# Patient Record
Sex: FEMALE | Race: WHITE | NOT HISPANIC OR LATINO | Employment: OTHER | URBAN - METROPOLITAN AREA
[De-identification: names, ages, dates, MRNs, and addresses within clinical notes are randomized per-mention and may not be internally consistent; named-entity substitution may affect disease eponyms.]

---

## 2017-03-29 ENCOUNTER — PATIENT OUTREACH (OUTPATIENT)
Dept: HEALTH INFORMATION MANAGEMENT | Facility: OTHER | Age: 77
End: 2017-03-29

## 2017-03-29 NOTE — PROGRESS NOTES
Attempt #:1    Verify PCP: yes    Communication Preference Obtained: yes     Review Care Team: no    Annual Wellness Visit Scheduling  1. Scheduling Status:Scheduled        Care Gap Scheduling (Attempt to Schedule EACH Overdue Care Gap!)     Health Maintenance Due   Topic Date Due   • Annual Wellness Visit  Scheduled   • IMM ZOSTER VACCINE     • BONE DENSITY  Will discuss   • IMM PNEUMOCOCCAL 65+ (ADULT) LOW/MEDIUM RISK SERIES (2 of 2 - PCV13) Due for Prevnar   • MAMMOGRAM  Patient had mammo 8/2016 scanned into media   • IMM INFLUENZA (1)          MyChart Activation: declined  Connectloudhart Ana: no  Virtual Visits: no  Opt In to Text Messages: no

## 2017-04-03 ENCOUNTER — OFFICE VISIT (OUTPATIENT)
Dept: MEDICAL GROUP | Facility: PHYSICIAN GROUP | Age: 77
End: 2017-04-03
Payer: MEDICARE

## 2017-04-03 VITALS
OXYGEN SATURATION: 98 % | HEIGHT: 63 IN | WEIGHT: 162.2 LBS | BODY MASS INDEX: 28.74 KG/M2 | SYSTOLIC BLOOD PRESSURE: 128 MMHG | DIASTOLIC BLOOD PRESSURE: 82 MMHG | HEART RATE: 76 BPM | TEMPERATURE: 98.2 F

## 2017-04-03 DIAGNOSIS — M51.36 DEGENERATIVE LUMBAR DISC: ICD-10-CM

## 2017-04-03 DIAGNOSIS — N95.1 SYMPTOMATIC MENOPAUSAL OR FEMALE CLIMACTERIC STATES: ICD-10-CM

## 2017-04-03 DIAGNOSIS — R53.82 CHRONIC FATIGUE: ICD-10-CM

## 2017-04-03 DIAGNOSIS — I10 ESSENTIAL HYPERTENSION, BENIGN: Chronic | ICD-10-CM

## 2017-04-03 DIAGNOSIS — E55.9 VITAMIN D DEFICIENCY: ICD-10-CM

## 2017-04-03 DIAGNOSIS — Z00.00 MEDICARE ANNUAL WELLNESS VISIT, INITIAL: Primary | ICD-10-CM

## 2017-04-03 DIAGNOSIS — E78.00 PURE HYPERCHOLESTEROLEMIA: Chronic | ICD-10-CM

## 2017-04-03 DIAGNOSIS — R94.31 ABNORMAL EKG: ICD-10-CM

## 2017-04-03 DIAGNOSIS — M47.817 LUMBOSACRAL SPONDYLOSIS WITHOUT MYELOPATHY: ICD-10-CM

## 2017-04-03 DIAGNOSIS — H61.23 CERUMEN DEBRIS ON TYMPANIC MEMBRANE OF BOTH EARS: ICD-10-CM

## 2017-04-03 DIAGNOSIS — H61.23 EXCESSIVE CERUMEN IN BOTH EAR CANALS: ICD-10-CM

## 2017-04-03 PROCEDURE — G8510 SCR DEP NEG, NO PLAN REQD: HCPCS | Performed by: NURSE PRACTITIONER

## 2017-04-03 PROCEDURE — G0438 PPPS, INITIAL VISIT: HCPCS | Performed by: NURSE PRACTITIONER

## 2017-04-03 PROCEDURE — 1036F TOBACCO NON-USER: CPT | Performed by: NURSE PRACTITIONER

## 2017-04-03 RX ORDER — M-VIT,TX,IRON,MINS/CALC/FOLIC 27MG-0.4MG
1 TABLET ORAL DAILY
COMMUNITY

## 2017-04-03 RX ORDER — MULTIVIT WITH MINERALS/LUTEIN
1 TABLET ORAL DAILY
COMMUNITY
End: 2023-08-30

## 2017-04-03 RX ORDER — THIAMINE MONONITRATE (VIT B1) 100 MG
100 TABLET ORAL DAILY
COMMUNITY

## 2017-04-03 ASSESSMENT — PATIENT HEALTH QUESTIONNAIRE - PHQ9: CLINICAL INTERPRETATION OF PHQ2 SCORE: 0

## 2017-04-03 NOTE — ASSESSMENT & PLAN NOTE
Chronic condition managed with current medical regimen  States unchanged   Continue with surveillance  Followed by Marbella LEYVA M.D..

## 2017-04-03 NOTE — ASSESSMENT & PLAN NOTE
States stopped med due to breast pain and L breast larger than right  Will be evaluated 4/10/2017. Had normal mammo 8/2016  States cont to have hot flashes

## 2017-04-03 NOTE — ASSESSMENT & PLAN NOTE
Chronic condition managed with current medical regimen  Labs reviewed   Stable per review   Continue with diet management  Followed by Marbella LEYVA M.D..

## 2017-04-03 NOTE — ASSESSMENT & PLAN NOTE
States expected to have ears irrigated at this appt  Has an appt 4/10/2017 will be managed then  States was sched to have ears irrigated at previous appt, not done due to pt leaving, states was charged

## 2017-04-03 NOTE — PATIENT INSTRUCTIONS
Continue with care through Marbella LEYVA M.D..  Next Medicare Annual Wellness Visit is due in one year.    Continue care with specialists you are seeing for your chronic problems.    Attached is some preventative information for you to read.          Fall Prevention and Home Safety  Falls cause injuries and can affect all age groups. It is possible to prevent falls.   HOW TO PREVENT FALLS  · Wear shoes with rubber soles that do not have an opening for your toes.   · Keep the inside and outside of your house well lit.   · Use night lights throughout your home.   · Remove clutter from floors.   · Clean up floor spills.   · Remove throw rugs or fasten them to the floor with carpet tape.   · Do not place electrical cords across pathways.   · Put grab bars by your tub, shower, and toilet. Do not use towel bars as grab bars.   · Put handrails on both sides of the stairway. Fix loose handrails.   · Do not climb on stools or stepladders, if possible.   · Do not wax your floors.   · Repair uneven or unsafe sidewalks, walkways, or stairs.   · Keep items you use a lot within reach.   · Be aware of pets.   · Keep emergency numbers next to the telephone.   · Put smoke detectors in your home and near bedrooms.   Ask your doctor what other things you can do to prevent falls.  Document Released: 10/14/2010 Document Revised: 06/18/2013 Document Reviewed: 03/19/2013  ExitCare® Patient Information ©2013 [employer].    Exercise to Stay Healthy      Exercise helps you become and stay healthy.  EXERCISE IDEAS AND TIPS  Choose exercises that:  · You enjoy.   · Fit into your day.   You do not need to exercise really hard to be healthy. You can do exercises at a slow or medium level and stay healthy. You can:  · Stretch before and after working out.   · Try yoga, Pilates, or todd chi.   · Lift weights.   · Walk fast, swim, jog, run, climb stairs, bicycle, dance, or rollerskate.   · Take aerobic classes.   Exercises that burn about  150 calories:  · Running 1 ½ miles in 15 minutes.   · Playing volleyball for 45 to 60 minutes.   · Washing and waxing a car for 45 to 60 minutes.   · Playing touch football for 45 minutes.   · Walking 1 ¾ miles in 35 minutes.   · Pushing a stroller 1 ½ miles in 30 minutes.   · Playing basketball for 30 minutes.   · Raking leaves for 30 minutes.   · Bicycling 5 miles in 30 minutes.   · Walking 2 miles in 30 minutes.   · Dancing for 30 minutes.   · Shoveling snow for 15 minutes.   · Swimming laps for 20 minutes.   · Walking up stairs for 15 minutes.   · Bicycling 4 miles in 15 minutes.   · Gardening for 30 to 45 minutes.   · Jumping rope for 15 minutes.   · Washing windows or floors for 45 to 60 minutes.     One suggestion is to start walking for 10 minutes after each meal.  This will help with digestion and help you to metabolize your meal.       For Weight Loss -   Recommend portion sizes with more fruits/vegetables/high fiber foods.  Stay away from white bread/pastas/white rice/white potatoes.  Increase Fluid intake to 6-8 glasses of water daily.   Eliminate soda's (diet and regular) from your fluid intake.      Document Released: 01/20/2012 Document Revised: 03/11/2013 Document Reviewed: 01/20/2012  ExitCare® Patient Information ©2013 [employer].    Fat and Cholesterol Control Diet  Cholesterol is a wax-like substance. It comes from your liver and is found in certain foods. There is good (HDL) and bad (LDL) cholesterol. Too much cholesterol in your blood can affect your heart. Certain foods can lower or raise your cholesterol. Eat foods that are low in cholesterol.  Saturated and trans fats are bad fats found in foods that will raise your cholesterol. Do not eat foods that are high in saturated and trans fats.  FOODS HIGHER IN SATURATED AND TRANS FATS  · Dairy products, such as whole milk, eggs, cheese, cream, and butter.   · Fatty meats, such as hot dogs, sausage, and salami.   · Fried foods.   · Trans fats  which are found in margarine and pre-made cookies, crackers, and baked goods.   · Tropical oils, such as coconut and palm oils.   Read package labels at the store. Do not buy products that use saturated or trans fats or hydrogenated oils. Find foods labeled:  · Low-fat.   · Low-saturated fat.   · Trans-fat-free.   · Low-cholesterol.   FOODS LOWER IN CHOLESTEROL  ·  Fruit.   · Vegetables.   · Beans, peas, and lentils.   · Fish.   · Lean meat, such as chicken (without skin) or ground turkey.   · Grains, such as barley, rice, couscous, bulgur wheat, and pasta.   · Heart-healthy tub margarine.   PREPARING YOUR FOOD  · Broil, bake, steam, or roast foods. Do not benjamin food.   · Use non-stick cooking sprays.   · Use lemon or herbs to flavor food instead of using butter or stick margarine.   · Use nonfat yogurt, salsa, or low-fat dressings for salads.   Document Released: 06/18/2013 Document Reviewed: 06/18/2013  ExitCare® Patient Information ©2013 [employer].    Recommend annual flu vaccine.  Next due in Fall, 2015.  If you decide not to have the flu vaccine, recommend good handwashing and staying out of crowds during flu season.

## 2017-04-03 NOTE — ASSESSMENT & PLAN NOTE
Chronic condition managed with current medical regimen  Stable per review   Continue with current OTC prn meds  Followed by Marbella LEYVA M.D..

## 2017-04-03 NOTE — ASSESSMENT & PLAN NOTE
Chronic condition managed with current medical regimen  Stable per review   Continue with current meds  Followed by Marbella LEYVA M.D..

## 2017-04-03 NOTE — MR AVS SNAPSHOT
"        Julienne Ross Mirna   4/3/2017 10:40 AM   Office Visit   MRN: [id number]    Department:  [facility]   Dept Phone:  [phone]    Description:  Female : 1940   Provider:  YASMIN Cano           Reason for Visit     Annual Exam initial      Allergies as of 4/3/2017     Allergen Noted Reactions    Gabapentin (Phn) 2015   Unspecified    Patient stated that she believes medication made it hard for her to stand alone and use restroom.     Pcn [Penicillins] 2009   Rash    Tramadol 2015       dizzy      You were diagnosed with     Medicare annual wellness visit, initial   [501935]  -  Primary     Vitamin D deficiency   [8190485]       Symptomatic menopausal or female climacteric states   [627.2.ICD-9-CM]       Pure hypercholesterolemia   [272.0.ICD-9-CM]       Lumbosacral spondylosis without myelopathy   [721.3.ICD-9-CM]       Excessive cerumen in both ear canals   [0156889]       Essential hypertension, benign   [401.1.ICD-9-CM]       Degenerative lumbar disc   [179886]       Chronic fatigue   [949300]       Cerumen debris on tympanic membrane of both ears   [3595180]       Abnormal EKG   [407875]         Vital Signs     Blood Pressure Pulse Temperature Height Weight Body Mass Index    128/82 mmHg 76 36.8 °C (98.2 °F) 1.6 m (5' 3\") 73.573 kg (162 lb 3.2 oz) 28.74 kg/m2    Oxygen Saturation Smoking Status                98% Never Smoker           Basic Information     Date Of Birth Sex Race Ethnicity Preferred Language    1940 Female White Non- English      Your appointments     Apr 10, 2017  1:40 PM   Established Patient with YASMIN Chi   [facility] (--)    [address] [place] [address] [phone]           You will be receiving a confirmation call a few days before your appointment from our automated call confirmation system.            2017  9:00 AM   Adult Draw/Collection with HAILY RUANO   "   LAB - ED (--)    [address] [address]   [phone]            May 02, 2017  9:00 AM   Established Patient with Marbella LEYVA M.D.   [facility] (--)    [address] [address] [phone]           You will be receiving a confirmation call a few days before your appointment from our automated call confirmation system.              Problem List              ICD-10-CM Priority Class Noted - Resolved    Symptomatic menopausal or female climacteric states N95.1   Unknown - Present    Osteoarthritis of multiple joints M15.9   Unknown - Present    Allergic rhinitis J30.9   Unknown - Present    Pure hypercholesterolemia (Chronic) E78.00   3/25/2010 - Present    Vitamin D deficiency E55.9   3/17/2011 - Present    Degenerative lumbar disc M51.36   2/23/2012 - Present    Essential hypertension, benign (Chronic) I10   4/5/2013 - Present    Pain of foot M79.673   3/20/2015 - Present    Lumbosacral spondylosis without myelopathy M47.817   4/4/2015 - Present    Abnormal EKG R94.31   5/19/2015 - Present    Excessive cerumen in both ear canals H61.23   10/20/2015 - Present    Chronic fatigue R53.82   7/28/2016 - Present      Health Maintenance        Date Due Completion Dates    IMM ZOSTER VACCINE 4/19/2001 ---    BONE DENSITY 4/19/2006 ---    IMM PNEUMOCOCCAL 65+ (ADULT) LOW/MEDIUM RISK SERIES (2 of 2 - PCV13) 4/6/2010 4/6/2009    MAMMOGRAM 6/24/2014 6/24/2013 (Done), 6/21/2012 (Prv Comp)    Override on 6/24/2013: Done ([facility]- Normal- 1yr)    Override on 6/21/2012: Previously completed ([facility], negative will do in 2013)    COLONOSCOPY 4/5/2023 4/5/2013 (Declined)    Override on 4/5/2013: Patient Declined (Pt refuses completely)    IMM DTaP/Tdap/Td Vaccine (2 - Td) 2/8/2027 2/8/2017            Current Immunizations     Influenza Vaccine Quad Inj (Pf) 10/9/2014    MMR Vaccine 3/8/2017    Pneumococcal polysaccharide vaccine (PPSV-23) 4/6/2009    Tdap Vaccine  2/8/2017      Below and/or attached are the medications your provider expects you to take. Review all of your home medications and newly ordered medications with your provider and/or pharmacist. Follow medication instructions as directed by your provider and/or pharmacist. Please keep your medication list with you and share with your provider. Update the information when medications are discontinued, doses are changed, or new medications (including over-the-counter products) are added; and carry medication information at all times in the event of emergency situations     Allergies:  GABAPENTIN (PHN) - Unspecified     PCN - Rash     TRAMADOL - (reactions not documented)               Medications  Valid as of: April 03, 2017 - 12:38 PM    Generic Name Brand Name Tablet Size Instructions for use    Cholecalciferol (Tab) cholecalciferol 1000 UNIT Take 1,000 Units by mouth every day.        Hydrocodone-Acetaminophen (Tab) Hydrocodone-Acetaminophen 2.5-325 MG Take 1 Tab by mouth 3 times a day as needed.        Lisinopril (Tab) PRINIVIL 10 MG TAKE 1 TABLET BY MOUTH EVERY DAY        Multiple Vitamins-Minerals (Tab) THERAGRAN-M  Take 1 Tab by mouth every day.        Thiamine HCl (Tab) THIAMINE 100 MG Take 100 mg by mouth every day.        Vitamin E (Cap) VITAMIN E 1000 UNIT Take 1 Cap by mouth every day.        .                 Medicines prescribed today were sent to:     [facility] [id number] [place] [address] [place] - [address] [facility] [address]    [address] [place] [address]    Phone: [phone] Fax: [phone]    Open 24 Hours?: No      Medication refill instructions:       If your prescription bottle indicates you have medication refills left, it is not necessary to call your provider’s office. Please contact your pharmacy and they will refill your medication.    If your prescription bottle indicates you do not have any refills left, you may request refills at any time through one of the following ways:  The online [employer] system (except Urgent Care), by calling your provider’s office, or by asking your pharmacy to contact your provider’s office with a refill request. Medication refills are processed only during regular business hours and may not be available until the next business day. Your provider may request additional information or to have a follow-up visit with you prior to refilling your medication.   *Please Note: Medication refills are assigned a new Rx number when refilled electronically. Your pharmacy may indicate that no refills were authorized even though a new prescription for the same medication is available at the pharmacy. Please request the medicine by name with the pharmacy before contacting your provider for a refill.        Instructions    Continue with care through Marbella LEYVA M.D..  Next Medicare Annual Wellness Visit is due in one year.    Continue care with specialists you are seeing for your chronic problems.    Attached is some preventative information for you to read.          Fall Prevention and Home Safety  Falls cause injuries and can affect all age groups. It is possible to prevent falls.   HOW TO PREVENT FALLS  · Wear shoes with rubber soles that do not have an opening for your toes.   · Keep the inside and outside of your house well lit.   · Use night lights throughout your home.   · Remove clutter from floors.   · Clean up floor spills.   · Remove throw rugs or fasten them to the floor with carpet tape.   · Do not place electrical cords across pathways.   · Put grab bars by your tub, shower, and toilet. Do not use towel bars as grab bars.   · Put handrails on both sides of the stairway. Fix loose handrails.   · Do not climb on stools or stepladders, if possible.   · Do not wax your floors.   · Repair uneven or unsafe sidewalks, walkways, or stairs.   · Keep items you use a lot within reach.   · Be aware of pets.   · Keep emergency numbers next to the telephone.   · Put  smoke detectors in your home and near bedrooms.   Ask your doctor what other things you can do to prevent falls.  Document Released: 10/14/2010 Document Revised: 06/18/2013 Document Reviewed: 03/19/2013  ExitCare® Patient Information ©2013 [employer].    Exercise to Stay Healthy      Exercise helps you become and stay healthy.  EXERCISE IDEAS AND TIPS  Choose exercises that:  · You enjoy.   · Fit into your day.   You do not need to exercise really hard to be healthy. You can do exercises at a slow or medium level and stay healthy. You can:  · Stretch before and after working out.   · Try yoga, Pilates, or todd chi.   · Lift weights.   · Walk fast, swim, jog, run, climb stairs, bicycle, dance, or rollerskate.   · Take aerobic classes.   Exercises that burn about 150 calories:  · Running 1 ½ miles in 15 minutes.   · Playing volleyball for 45 to 60 minutes.   · Washing and waxing a car for 45 to 60 minutes.   · Playing touch football for 45 minutes.   · Walking 1 ¾ miles in 35 minutes.   · Pushing a stroller 1 ½ miles in 30 minutes.   · Playing basketball for 30 minutes.   · Raking leaves for 30 minutes.   · Bicycling 5 miles in 30 minutes.   · Walking 2 miles in 30 minutes.   · Dancing for 30 minutes.   · Shoveling snow for 15 minutes.   · Swimming laps for 20 minutes.   · Walking up stairs for 15 minutes.   · Bicycling 4 miles in 15 minutes.   · Gardening for 30 to 45 minutes.   · Jumping rope for 15 minutes.   · Washing windows or floors for 45 to 60 minutes.     One suggestion is to start walking for 10 minutes after each meal.  This will help with digestion and help you to metabolize your meal.       For Weight Loss -   Recommend portion sizes with more fruits/vegetables/high fiber foods.  Stay away from white bread/pastas/white rice/white potatoes.  Increase Fluid intake to 6-8 glasses of water daily.   Eliminate soda's (diet and regular) from your fluid intake.      Document Released: 01/20/2012 Document  Revised: 03/11/2013 Document Reviewed: 01/20/2012  [employer]® Patient Information ©2013 [employer].    Fat and Cholesterol Control Diet  Cholesterol is a wax-like substance. It comes from your liver and is found in certain foods. There is good (HDL) and bad (LDL) cholesterol. Too much cholesterol in your blood can affect your heart. Certain foods can lower or raise your cholesterol. Eat foods that are low in cholesterol.  Saturated and trans fats are bad fats found in foods that will raise your cholesterol. Do not eat foods that are high in saturated and trans fats.  FOODS HIGHER IN SATURATED AND TRANS FATS  · Dairy products, such as whole milk, eggs, cheese, cream, and butter.   · Fatty meats, such as hot dogs, sausage, and salami.   · Fried foods.   · Trans fats which are found in margarine and pre-made cookies, crackers, and baked goods.   · Tropical oils, such as coconut and palm oils.   Read package labels at the store. Do not buy products that use saturated or trans fats or hydrogenated oils. Find foods labeled:  · Low-fat.   · Low-saturated fat.   · Trans-fat-free.   · Low-cholesterol.   FOODS LOWER IN CHOLESTEROL  ·  Fruit.   · Vegetables.   · Beans, peas, and lentils.   · Fish.   · Lean meat, such as chicken (without skin) or ground turkey.   · Grains, such as barley, rice, couscous, bulgur wheat, and pasta.   · Heart-healthy tub margarine.   PREPARING YOUR FOOD  · Broil, bake, steam, or roast foods. Do not benjamin food.   · Use non-stick cooking sprays.   · Use lemon or herbs to flavor food instead of using butter or stick margarine.   · Use nonfat yogurt, salsa, or low-fat dressings for salads.   Document Released: 06/18/2013 Document Reviewed: 06/18/2013  [employer]® Patient Information ©2013 [employer].    Recommend annual flu vaccine.  Next due in Fall, 2015.  If you decide not to have the flu vaccine, recommend good handwashing and staying out of crowds during flu season.               Other Notes  About Your Plan     ER visit for dizzy, signed AMA, CT brain neg, Carotids neg, Echo EF 65%, Cxr increased heart size 10/13/14 [place]           MyChart Status: Patient Declined

## 2017-04-03 NOTE — PROGRESS NOTES
CC:   Medicare Annual Wellness Visit    HPI:  Julienne is a 76 y.o. here for Medicare Annual Wellness Visit    Patient Active Problem List    Diagnosis Date Noted   • Chronic fatigue 07/28/2016   • Excessive cerumen in both ear canals 10/20/2015   • Abnormal EKG 05/19/2015   • Lumbosacral spondylosis without myelopathy 04/04/2015   • Pain of foot 03/20/2015   • Essential hypertension, benign 04/05/2013   • Degenerative lumbar disc 02/23/2012   • Vitamin D deficiency 03/17/2011   • Pure hypercholesterolemia 03/25/2010   • Symptomatic menopausal or female climacteric states    • Osteoarthritis of multiple joints    • Allergic rhinitis      Current Outpatient Prescriptions   Medication Sig Dispense Refill   • therapeutic multivitamin-minerals (THERAGRAN-M) Tab Take 1 Tab by mouth every day.     • vitamin D (CHOLECALCIFEROL) 1000 UNIT Tab Take 1,000 Units by mouth every day.     • vitamin E (VITAMIN E) 1000 UNIT Cap Take 1 Cap by mouth every day.     • thiamine (THIAMINE) 100 MG Tab Take 100 mg by mouth every day.     • lisinopril (PRINIVIL) 10 MG Tab TAKE 1 TABLET BY MOUTH EVERY DAY 90 Tab 1   • Hydrocodone-Acetaminophen 2.5-325 MG Tab Take 1 Tab by mouth 3 times a day as needed. 90 Tab 1     No current facility-administered medications for this visit.      Current supplements: no  Chronic narcotic pain medicines: yes  Allergies: Gabapentin (phn); Pcn; and Tramadol  Exercise: as verna  Current social contact/activities: Has support of family and friends      Screening:  Depression Screening    Little interest or pleasure in doing things?  0 - not at all  Feeling down, depressed , or hopeless? 0 - not at all  Trouble falling or staying asleep, or sleeping too much?     Feeling tired or having little energy?     Poor appetite or overeating?     Feeling bad about yourself - or that you are a failure or have let yourself or your family down?    Trouble concentrating on things, such as reading the newspaper or watching  television?    Moving or speaking so slowly that other people could have noticed.  Or the opposite - being so fidgety or restless that you have been moving around a lot more than usual?     Thoughts that you would be better off dead, or of hurting yourself?     Patient Health Questionnaire Score:    If depressive symptoms identified deferred to follow up visit unless specifically addressed in assessment and plan.      Screening for Cognitive Impairment    Three Minute Recall (banana, sunrise, fence)  2/3    Draw clock face with all 12 numbers set to the hand to show 10 minures past 11 o'clock  1 5  If cognitive concerns identified deferred to follow up visit unless specifically addressed in assessment and plan.    Fall Risk Assessment    Has the patient had two or more falls in the last year or any fall with injury in the last year?  No  If Fall Risk identified deferred to follow up visit unless specifically addressed in assessment and plan.    Safety Assessment    Throw rugs on floor.  No  Handrails on all stairs.  Yes  Good lighting in all hallways.  Yes  Difficulty hearing.  No  Patient counseled about all safety risks that were identified.    Functional Assessment ADLs    Are there any barriers preventing you from cooking for yourself or meeting nutritional needs?  No.    Are there any barriers preventing you from driving safely or obtaining transportation?  No.    Are there any barriers preventing you from using a telephone or calling for help?  No.    Are there any barriers preventing you from shopping?  No.    Are there any barriers preventing you from taking care of your own finances?  No.    Are there any barriers preventing you from managing your medications?  No.    Are currently engaing any exercise or physical activity?  Yes.       Health Maintenance Summary                Annual Wellness Visit Overdue 1940     IMM ZOSTER VACCINE Overdue 4/19/2001     BONE DENSITY Overdue 4/19/2006     IMM  PNEUMOCOCCAL 65+ (ADULT) LOW/MEDIUM RISK SERIES Overdue 4/6/2010      Done 4/6/2009 Imm Admin: Pneumococcal polysaccharide vaccine (PPSV-23)    MAMMOGRAM Overdue 6/24/2014      Done 6/24/2013 [facility]- Normal- 1yr     Patient has more history with this topic...    COLONOSCOPY Next Due 4/5/2023      Patient Declined 4/5/2013 Pt refuses completely    IMM DTaP/Tdap/Td Vaccine Next Due 2/8/2027      Done 2/8/2017 Imm Admin: Tdap Vaccine          Patient Care Team:  Marbella LEYVA M.D. as PCP - General (Family Medicine)  Chris Guevara M.D. as Consulting Physician (Orthopaedics)  Greg Mccarthy M.D. as Consulting Physician (Neurosurgery)        Social History   Substance Use Topics   • Smoking status: Never Smoker    • Smokeless tobacco: Never Used   • Alcohol Use: No       Family History   Problem Relation Age of Onset   • Heart Disease Mother      CAD   • Hypertension Mother    • Stroke Mother      She  has a past medical history of Symptomatic menopausal or female climacteric states; Essential hypertension, malignant; Screening mammogram (5/19/2009); Allergic rhinitis, cause unspecified; Osteoarthrosis involving, or with mention of more than one site, but not specified as generalized, multiple sites; Hypokalemia (3/25/2010); Pure hypercholesterolemia (3/25/2010); Neuritis/radiculitis due to displacement of lumbar intervertebral disc (2/23/2012); Essential hypertension, benign (4/5/2013); Pain of foot (3/20/2015); Pain (4/2/15); Anesthesia; Urinary bladder disorder; Dental disorder; Abnormal EKG (5/19/2015); Excessive cerumen in both ear canals (10/20/2015); and Chronic fatigue (7/28/2016).   Past Surgical History   Procedure Laterality Date   • Laminotomy       cervical spine   • Carpal tunnel release  2006     right   • Lumbar laminectomy diskectomy  4/4/2015     Performed by Greg Mccarthy M.D. at SURGERY [facility]       ROS:    Ostomy or other tubes or amputations: no  Chronic oxygen use no  Last eye exam  "2016  : Denies incontinence.   Gait: Uses no assistive device   Hearing fair.    Dentition fair    Exam: Blood pressure 128/82, pulse 76, temperature 36.8 °C (98.2 °F), height 1.6 m (5' 3\"), weight 73.573 kg (162 lb 3.2 oz), SpO2 98 %. Body mass index is 28.74 kg/(m^2).  Alert, oriented in no acute distress.  Eye contact is good, speech goal directed, affect calm      Assessment and Plan. The following treatment and monitoring plan is recommended for all problems as listed below:   Vitamin D deficiency  Chronic condition managed with current medical regimen  Stable per review   Continue with current meds  Followed by Marbella LEYVA M.D..         Symptomatic menopausal or female climacteric states  States stopped med due to breast pain and L breast larger than right  Will be evaluated 4/10/2017. Had normal mammo 8/2016  States cont to have hot flashes    Pure hypercholesterolemia  Chronic condition managed with current medical regimen  Labs reviewed   Stable per review   Continue with diet management  Followed by Marbella LEYVA M.D..         Osteoarthritis of multiple joints  Chronic condition managed with current medical regimen  Stable per review   Continue with current meds  Followed by Marbella LEYVA M.D..         Pain of foot  Chronic condition managed with current medical regimen  Stable per review   Continue with current meds  Followed by Marbella LEYVA M.D..         Lumbosacral spondylosis without myelopathy  Chronic condition managed with current medical regimen  Stable per review   Continue with current meds  Followed by Marbella LEYVA M.D..        Excessive cerumen in both ear canals  States expected to have ears irrigated at this appt  Has an appt 4/10/2017 will be managed then  States was sched to have ears irrigated at previous appt, not done due to pt leaving, states was charged    Essential hypertension, benign  Chronic condition managed with current medical regimen  Stable per " "review   Continue with current meds  Followed by Marbella LEYVA M.D..        Degenerative lumbar disc  Chronic condition managed with current medical regimen  Stable per review   Continue with current meds  Followed by Marbella LEYVA M.D..        Chronic fatigue  Chronic condition managed with current medical regimen  States unchanged   Continue with surveillance  Followed by Marbella LEYVA M.D..        Cerumen debris on tympanic membrane of both ears  duplicate    Allergic rhinitis  Chronic condition managed with current medical regimen  Stable per review   Continue with current OTC prn meds  Followed by Marbella LEYVA M.D..        Abnormal EKG  To pts knowledge first and only abn EKG  Followed by Marbella LEYVA M.D..         Health Care Screening recommendations reviewed with patient today: per Patient Instructions  DPA/Advanced directive: .has an advanced directive - recom a copy be provided    Next office visit for recheck of chronic medical conditions is due with Marbella LEYVA M.D. 5/2/2017      States mammo was done 2/2017  States agreed to today's appt \"because it is hard to get to see my doctor and my breast is a problem\"  appt made for pt to be seen 4/10/2017 at 1:40 pm for eval  Pt not pleased with not having an exam today    "

## 2017-04-10 ENCOUNTER — OFFICE VISIT (OUTPATIENT)
Dept: MEDICAL GROUP | Facility: PHYSICIAN GROUP | Age: 77
End: 2017-04-10
Payer: MEDICARE

## 2017-04-10 VITALS
TEMPERATURE: 99.1 F | DIASTOLIC BLOOD PRESSURE: 76 MMHG | WEIGHT: 162 LBS | RESPIRATION RATE: 16 BRPM | OXYGEN SATURATION: 98 % | SYSTOLIC BLOOD PRESSURE: 122 MMHG | HEIGHT: 63 IN | BODY MASS INDEX: 28.7 KG/M2 | HEART RATE: 71 BPM

## 2017-04-10 DIAGNOSIS — N64.4 BREAST PAIN, LEFT: ICD-10-CM

## 2017-04-10 DIAGNOSIS — M65.341 TRIGGER RING FINGER OF RIGHT HAND: ICD-10-CM

## 2017-04-10 PROCEDURE — 4040F PNEUMOC VAC/ADMIN/RCVD: CPT | Performed by: NURSE PRACTITIONER

## 2017-04-10 PROCEDURE — G8432 DEP SCR NOT DOC, RNG: HCPCS | Performed by: NURSE PRACTITIONER

## 2017-04-10 PROCEDURE — 1036F TOBACCO NON-USER: CPT | Performed by: NURSE PRACTITIONER

## 2017-04-10 PROCEDURE — 1101F PT FALLS ASSESS-DOCD LE1/YR: CPT | Performed by: NURSE PRACTITIONER

## 2017-04-10 PROCEDURE — G8419 CALC BMI OUT NRM PARAM NOF/U: HCPCS | Performed by: NURSE PRACTITIONER

## 2017-04-10 PROCEDURE — 99213 OFFICE O/P EST LOW 20 MIN: CPT | Performed by: NURSE PRACTITIONER

## 2017-04-10 PROCEDURE — 3017F COLORECTAL CA SCREEN DOC REV: CPT | Mod: 8P | Performed by: NURSE PRACTITIONER

## 2017-04-10 ASSESSMENT — PAIN SCALES - GENERAL: PAINLEVEL: 4=SLIGHT-MODERATE PAIN

## 2017-04-10 NOTE — PROGRESS NOTES
"[facility]  Primary Care Office Visit - Problem-Oriented        History:     Julienne Bradley is a 76 y.o. female who is here today to discuss Breast Problem      Breast pain, left  This is a 76-year-old female who is here for left breast pain that has been ongoing ×1 month. She tells me that she has been on estrogen hormone replacement for nearly 20 years for severe hot flashes, she has tried to taper down and discontinue use of this over the last 2 years but she has had return of incapacitating hot flashes. She developed left breast pain about a month ago and abruptly d/c the estrogen. She also notes that her left breast has suddenly increased in size. She denies any new masses, fevers, unintentional weight loss, night sweats. She has been taking an over-the-counter herbal estrogen replacement which does seem to be improving her hot flashes. Her last MRI was done in August 2016, it did show heterogeneously dense breasts, otherwise normal mammogram.      Trigger ring finger of right hand  Patient also notes that she has right hand ring finger pain and \"catching.\" She tells me that she has to \"open the finger on my own\" with a lot of pain, we discussed that this is trigger finger and she will need to see a hand surgeon, referral has been placed.           Past Medical History   Diagnosis Date   • Symptomatic menopausal or female climacteric states    • Essential hypertension, malignant    • Screening mammogram 5/19/2009     normal   • Allergic rhinitis, cause unspecified    • Osteoarthrosis involving, or with mention of more than one site, but not specified as generalized, multiple sites    • Hypokalemia 3/25/2010   • Pure hypercholesterolemia 3/25/2010   • Neuritis/radiculitis due to displacement of lumbar intervertebral disc 2/23/2012   • Essential hypertension, benign 4/5/2013   • Pain of foot 3/20/2015   • Pain 4/2/15     legs, mostly right, into foot   • Anesthesia      PONV   • Urinary bladder " disorder      nocturia   • Dental disorder      partials, upper and lower   • Abnormal EKG 5/19/2015   • Excessive cerumen in both ear canals 10/20/2015   • Chronic fatigue 7/28/2016     Past Surgical History   Procedure Laterality Date   • Laminotomy       cervical spine   • Carpal tunnel release  2006     right   • Lumbar laminectomy diskectomy  4/4/2015     Performed by Greg Mccarthy M.D. at SURGERY [facility]     Social History     Social History   • Marital Status:      Spouse Name: N/A   • Number of Children: N/A   • Years of Education: N/A     Occupational History   • Not on file.     Social History Main Topics   • Smoking status: Never Smoker    • Smokeless tobacco: Never Used   • Alcohol Use: No   • Drug Use: No   • Sexual Activity: No      Comment: ,retired     Other Topics Concern   •  Service No   • Blood Transfusions No   • Caffeine Concern No   • Occupational Exposure No   • Hobby Hazards No   • Sleep Concern No   • Stress Concern No   • Weight Concern No   • Special Diet No   • Back Care Yes   • Exercise Yes      for grandkids, yardwork    • Bike Helmet No   • Seat Belt Yes   • Self-Exams Yes     Social History Narrative    , retired      History   Smoking status   • Never Smoker    Smokeless tobacco   • Never Used     Family History   Problem Relation Age of Onset   • Heart Disease Mother      CAD   • Hypertension Mother    • Stroke Mother      Allergies   Allergen Reactions   • Gabapentin (Phn) Unspecified     Patient stated that she believes medication made it hard for her to stand alone and use restroom.    • Pcn [Penicillins] Rash   • Tramadol      dizzy       Problem List:     Patient Active Problem List    Diagnosis Date Noted   • Breast pain, left 04/10/2017   • Trigger ring finger of right hand 04/10/2017   • Chronic fatigue 07/28/2016   • Excessive cerumen in both ear canals 10/20/2015   • Abnormal EKG 05/19/2015   • Lumbosacral  "spondylosis without myelopathy 04/04/2015   • Pain of foot 03/20/2015   • Essential hypertension, benign 04/05/2013   • Degenerative lumbar disc 02/23/2012   • Vitamin D deficiency 03/17/2011   • Pure hypercholesterolemia 03/25/2010   • Symptomatic menopausal or female climacteric states    • Osteoarthritis of multiple joints    • Allergic rhinitis          Medications:     Current outpatient prescriptions:   •  therapeutic multivitamin-minerals (THERAGRAN-M) Tab, Take 1 Tab by mouth every day., Disp: , Rfl:   •  vitamin D (CHOLECALCIFEROL) 1000 UNIT Tab, Take 1,000 Units by mouth every day., Disp: , Rfl:   •  vitamin E (VITAMIN E) 1000 UNIT Cap, Take 1 Cap by mouth every day., Disp: , Rfl:   •  thiamine (THIAMINE) 100 MG Tab, Take 100 mg by mouth every day., Disp: , Rfl:   •  lisinopril (PRINIVIL) 10 MG Tab, TAKE 1 TABLET BY MOUTH EVERY DAY, Disp: 90 Tab, Rfl: 1  •  Hydrocodone-Acetaminophen 2.5-325 MG Tab, Take 1 Tab by mouth 3 times a day as needed., Disp: 90 Tab, Rfl: 1      Review of Systems:     (positives in bold), all other systems reviewed and WNL     GYN: breast pain, vaginal discharge,painful intercourse, vaginal itching, abnormal bleeding  MSK: muscle/joint pain, joint swelling      Physical Assessment:     VS: /76 mmHg  Pulse 71  Temp(Src) 37.3 °C (99.1 °F)  Resp 16  Ht 1.6 m (5' 2.99\")  Wt 73.483 kg (162 lb)  BMI 28.70 kg/m2  SpO2 98%    General: Well-developed, well-nourished, female, hard of hearing     Head: PERRL, EOMI. Normocephalic. No facial asymmetry noted.  Cardiovasc:No JVD.  RRR, no MRG. No thrills or bruits. Pulses 2+ and symmetric at all distal extremities.  Pulmonary: Lungs clear bilaterally.  Normal respiratory effort. No wheeze or crackles.   Extremities: trigger finger of right hand ring finger   Neuro: Alert and oriented  Skin:No rashes noted. Skin warm, dry, intact    Psych: Dressed appropriately for the weather, pleasant and conversant.  Affect, mood & judgment " appropriate.    Physical Exam   Pulmonary/Chest:             Assessment/Plan:   Julienne was seen today for breast problem.    Diagnoses and all orders for this visit:    Breast pain, left, new   -     US-BREAST COMPLETE-LEFT; Future    Trigger ring finger of right hand, new   -     REFERRAL TO HAND SURGERY    Patient is agreeable to the above plan and voiced understanding. All questions answered.     Please note that this dictation was created using voice recognition software. I have made every reasonable attempt to correct obvious errors, but I expect that there are errors of grammar and possibly content that I did not discover before finalizing the note.      CYNDI Bourne  4/10/2017, 2:22 PM

## 2017-04-10 NOTE — ASSESSMENT & PLAN NOTE
"Patient also notes that she has right hand ring finger pain and \"catching.\" She tells me that she has to \"open the finger on my own\" with a lot of pain, we discussed that this is trigger finger and she will need to see a hand surgeon, referral has been placed.  "

## 2017-04-10 NOTE — MR AVS SNAPSHOT
"        Julienne Bradley   4/10/2017 1:40 PM   Office Visit   MRN: [id number]    Department:  [facility]   Dept Phone:  [phone]    Description:  Female : 1940   Provider:  YASMIN Chi           Reason for Visit     Breast Problem Left side is getting bigger than other side and hurts.       Allergies as of 4/10/2017     Allergen Noted Reactions    Gabapentin (Phn) 2015   Unspecified    Patient stated that she believes medication made it hard for her to stand alone and use restroom.     Pcn [Penicillins] 2009   Rash    Tramadol 2015       dizzy      You were diagnosed with     Breast pain, left   [407704]       Trigger ring finger of right hand   [[id number]]         Vital Signs     Blood Pressure Pulse Temperature Respirations Height Weight    122/76 mmHg 71 37.3 °C (99.1 °F) 16 1.6 m (5' 2.99\") 73.483 kg (162 lb)    Body Mass Index Oxygen Saturation Smoking Status             28.70 kg/m2 98% Never Smoker          Basic Information     Date Of Birth Sex Race Ethnicity Preferred Language    1940 Female White Non- English      Your appointments     2017  9:00 AM   Adult Draw/Collection with [facility]   [facility] (--)    [address]  [place] [address]   [phone]            May 02, 2017  9:00 AM   Established Patient with Marbella LEYVA M.D.   [facility] (--)    [address]  [facility] [id number] [phone]           You will be receiving a confirmation call a few days before your appointment from our automated call confirmation system.              Problem List              ICD-10-CM Priority Class Noted - Resolved    Symptomatic menopausal or female climacteric states N95.1   Unknown - Present    Osteoarthritis of multiple joints M15.9   Unknown - Present    Allergic rhinitis J30.9   Unknown - Present    Pure hypercholesterolemia (Chronic) E78.00   3/25/2010 - Present    Vitamin D deficiency " E55.9   3/17/2011 - Present    Degenerative lumbar disc M51.36   2/23/2012 - Present    Essential hypertension, benign (Chronic) I10   4/5/2013 - Present    Pain of foot M79.673   3/20/2015 - Present    Lumbosacral spondylosis without myelopathy M47.817   4/4/2015 - Present    Abnormal EKG R94.31   5/19/2015 - Present    Excessive cerumen in both ear canals H61.23   10/20/2015 - Present    Chronic fatigue R53.82   7/28/2016 - Present    Breast pain, left N64.4   4/10/2017 - Present    Trigger ring finger of right hand M65.341   4/10/2017 - Present      Health Maintenance        Date Due Completion Dates    IMM ZOSTER VACCINE 4/19/2001 ---    BONE DENSITY 4/19/2006 ---    IMM PNEUMOCOCCAL 65+ (ADULT) LOW/MEDIUM RISK SERIES (2 of 2 - PCV13) 4/6/2010 4/6/2009    MAMMOGRAM 6/24/2014 6/24/2013 (Done), 6/21/2012 (Prv Comp)    Override on 6/24/2013: Done ([facility]- Normal- 1yr)    Override on 6/21/2012: Previously completed ([facility], negative will do in 2013)    COLONOSCOPY 4/5/2023 4/5/2013 (Declined)    Override on 4/5/2013: Patient Declined (Pt refuses completely)    IMM DTaP/Tdap/Td Vaccine (2 - Td) 2/8/2027 2/8/2017            Current Immunizations     Influenza Vaccine Quad Inj (Pf) 10/9/2014    MMR Vaccine 3/8/2017    Pneumococcal polysaccharide vaccine (PPSV-23) 4/6/2009    Tdap Vaccine 2/8/2017      Below and/or attached are the medications your provider expects you to take. Review all of your home medications and newly ordered medications with your provider and/or pharmacist. Follow medication instructions as directed by your provider and/or pharmacist. Please keep your medication list with you and share with your provider. Update the information when medications are discontinued, doses are changed, or new medications (including over-the-counter products) are added; and carry medication information at all times in the event of emergency situations     Allergies:  GABAPENTIN (PHN) - Unspecified     PCN - Rash      TRAMADOL - (reactions not documented)               Medications  Valid as of: April 10, 2017 -  2:36 PM    Generic Name Brand Name Tablet Size Instructions for use    Cholecalciferol (Tab) cholecalciferol 1000 UNIT Take 1,000 Units by mouth every day.        Hydrocodone-Acetaminophen (Tab) Hydrocodone-Acetaminophen 2.5-325 MG Take 1 Tab by mouth 3 times a day as needed.        Lisinopril (Tab) PRINIVIL 10 MG TAKE 1 TABLET BY MOUTH EVERY DAY        Multiple Vitamins-Minerals (Tab) THERAGRAN-M  Take 1 Tab by mouth every day.        Thiamine HCl (Tab) THIAMINE 100 MG Take 100 mg by mouth every day.        Vitamin E (Cap) VITAMIN E 1000 UNIT Take 1 Cap by mouth every day.        .                 Medicines prescribed today were sent to:     [id number] - [place], [place] - [address] AT [facility] [address]    [address] [place] [address]    Phone: [phone] Fax: [phone]    Open 24 Hours?: No      Medication refill instructions:       If your prescription bottle indicates you have medication refills left, it is not necessary to call your provider’s office. Please contact your pharmacy and they will refill your medication.    If your prescription bottle indicates you do not have any refills left, you may request refills at any time through one of the following ways: The online [employer] system (except Urgent Care), by calling your provider’s office, or by asking your pharmacy to contact your provider’s office with a refill request. Medication refills are processed only during regular business hours and may not be available until the next business day. Your provider may request additional information or to have a follow-up visit with you prior to refilling your medication.   *Please Note: Medication refills are assigned a new Rx number when refilled electronically. Your pharmacy may indicate that no refills were authorized even though a new prescription for the same medication is available at the  pharmacy. Please request the medicine by name with the pharmacy before contacting your provider for a refill.        Your To Do List     Future Labs/Procedures Complete By Expires    US-BREAST COMPLETE-LEFT  As directed 4/10/2018      Referral     A referral request has been sent to our patient care coordination department. Please allow 3-5 business days for us to process this request and contact you either by phone or mail. If you do not hear from us by the 5th business day, please call us at [phone].        Other Notes About Your Plan     ER visit for dizzy, signed AMA, CT brain neg, Carotids neg, Echo EF 65%, Cxr increased heart size 10/13/14 [place]           MyChart Status: Patient Declined

## 2017-04-25 ENCOUNTER — HOSPITAL ENCOUNTER (OUTPATIENT)
Dept: LAB | Facility: MEDICAL CENTER | Age: 77
End: 2017-04-25
Attending: INTERNAL MEDICINE
Payer: MEDICARE

## 2017-04-25 DIAGNOSIS — D75.89 MACROCYTOSIS WITHOUT ANEMIA: ICD-10-CM

## 2017-04-25 DIAGNOSIS — M51.36 DEGENERATIVE LUMBAR DISC: ICD-10-CM

## 2017-04-25 LAB
ALBUMIN SERPL BCP-MCNC: 4.1 G/DL (ref 3.2–4.9)
ALBUMIN/GLOB SERPL: 1.3 G/DL
ALP SERPL-CCNC: 64 U/L (ref 30–99)
ALT SERPL-CCNC: 20 U/L (ref 2–50)
ANION GAP SERPL CALC-SCNC: 7 MMOL/L (ref 0–11.9)
AST SERPL-CCNC: 30 U/L (ref 12–45)
BASOPHILS # BLD AUTO: 1.1 % (ref 0–1.8)
BASOPHILS # BLD: 0.06 K/UL (ref 0–0.12)
BILIRUB SERPL-MCNC: 0.8 MG/DL (ref 0.1–1.5)
BUN SERPL-MCNC: 16 MG/DL (ref 8–22)
CALCIUM SERPL-MCNC: 9.5 MG/DL (ref 8.5–10.5)
CHLORIDE SERPL-SCNC: 104 MMOL/L (ref 96–112)
CO2 SERPL-SCNC: 28 MMOL/L (ref 20–33)
CREAT SERPL-MCNC: 0.81 MG/DL (ref 0.5–1.4)
EOSINOPHIL # BLD AUTO: 0.14 K/UL (ref 0–0.51)
EOSINOPHIL NFR BLD: 2.6 % (ref 0–6.9)
ERYTHROCYTE [DISTWIDTH] IN BLOOD BY AUTOMATED COUNT: 47.1 FL (ref 35.9–50)
FOLATE SERPL-MCNC: >23.3 NG/ML
GFR SERPL CREATININE-BSD FRML MDRD: >60 ML/MIN/1.73 M 2
GLOBULIN SER CALC-MCNC: 3.1 G/DL (ref 1.9–3.5)
GLUCOSE SERPL-MCNC: 92 MG/DL (ref 65–99)
HCT VFR BLD AUTO: 40.4 % (ref 37–47)
HGB BLD-MCNC: 13.2 G/DL (ref 12–16)
IMM GRANULOCYTES # BLD AUTO: 0 K/UL (ref 0–0.11)
IMM GRANULOCYTES NFR BLD AUTO: 0 % (ref 0–0.9)
LYMPHOCYTES # BLD AUTO: 1.77 K/UL (ref 1–4.8)
LYMPHOCYTES NFR BLD: 33.3 % (ref 22–41)
MCH RBC QN AUTO: 31.4 PG (ref 27–33)
MCHC RBC AUTO-ENTMCNC: 32.7 G/DL (ref 33.6–35)
MCV RBC AUTO: 96 FL (ref 81.4–97.8)
MONOCYTES # BLD AUTO: 0.51 K/UL (ref 0–0.85)
MONOCYTES NFR BLD AUTO: 9.6 % (ref 0–13.4)
NEUTROPHILS # BLD AUTO: 2.83 K/UL (ref 2–7.15)
NEUTROPHILS NFR BLD: 53.4 % (ref 44–72)
NRBC # BLD AUTO: 0 K/UL
NRBC BLD AUTO-RTO: 0 /100 WBC
PLATELET # BLD AUTO: 302 K/UL (ref 164–446)
PMV BLD AUTO: 11 FL (ref 9–12.9)
POTASSIUM SERPL-SCNC: 4.4 MMOL/L (ref 3.6–5.5)
PROT SERPL-MCNC: 7.2 G/DL (ref 6–8.2)
RBC # BLD AUTO: 4.21 M/UL (ref 4.2–5.4)
SODIUM SERPL-SCNC: 139 MMOL/L (ref 135–145)
VIT B12 SERPL-MCNC: 628 PG/ML (ref 211–911)
WBC # BLD AUTO: 5.3 K/UL (ref 4.8–10.8)

## 2017-04-25 PROCEDURE — 80053 COMPREHEN METABOLIC PANEL: CPT

## 2017-04-25 PROCEDURE — 82746 ASSAY OF FOLIC ACID SERUM: CPT

## 2017-04-25 PROCEDURE — 82607 VITAMIN B-12: CPT

## 2017-04-25 PROCEDURE — 36415 COLL VENOUS BLD VENIPUNCTURE: CPT

## 2017-04-25 PROCEDURE — 85025 COMPLETE CBC W/AUTO DIFF WBC: CPT

## 2017-05-10 ENCOUNTER — OFFICE VISIT (OUTPATIENT)
Dept: MEDICAL GROUP | Facility: PHYSICIAN GROUP | Age: 77
End: 2017-05-10
Payer: MEDICARE

## 2017-05-10 VITALS
HEIGHT: 63 IN | SYSTOLIC BLOOD PRESSURE: 140 MMHG | HEART RATE: 68 BPM | OXYGEN SATURATION: 98 % | BODY MASS INDEX: 29.06 KG/M2 | DIASTOLIC BLOOD PRESSURE: 86 MMHG | RESPIRATION RATE: 16 BRPM | WEIGHT: 164 LBS | TEMPERATURE: 99 F

## 2017-05-10 DIAGNOSIS — M51.36 DEGENERATIVE LUMBAR DISC: ICD-10-CM

## 2017-05-10 DIAGNOSIS — Z78.0 MENOPAUSE: ICD-10-CM

## 2017-05-10 DIAGNOSIS — N64.4 BREAST PAIN, LEFT: ICD-10-CM

## 2017-05-10 DIAGNOSIS — I10 ESSENTIAL HYPERTENSION, BENIGN: Chronic | ICD-10-CM

## 2017-05-10 DIAGNOSIS — E55.9 VITAMIN D DEFICIENCY: ICD-10-CM

## 2017-05-10 DIAGNOSIS — M47.817 LUMBOSACRAL SPONDYLOSIS WITHOUT MYELOPATHY: ICD-10-CM

## 2017-05-10 DIAGNOSIS — H61.22 CERUMEN DEBRIS ON TYMPANIC MEMBRANE OF LEFT EAR: ICD-10-CM

## 2017-05-10 PROCEDURE — 1101F PT FALLS ASSESS-DOCD LE1/YR: CPT | Performed by: INTERNAL MEDICINE

## 2017-05-10 PROCEDURE — G8419 CALC BMI OUT NRM PARAM NOF/U: HCPCS | Performed by: INTERNAL MEDICINE

## 2017-05-10 PROCEDURE — G8432 DEP SCR NOT DOC, RNG: HCPCS | Performed by: INTERNAL MEDICINE

## 2017-05-10 PROCEDURE — 4040F PNEUMOC VAC/ADMIN/RCVD: CPT | Performed by: INTERNAL MEDICINE

## 2017-05-10 PROCEDURE — 99214 OFFICE O/P EST MOD 30 MIN: CPT | Performed by: INTERNAL MEDICINE

## 2017-05-10 PROCEDURE — 1036F TOBACCO NON-USER: CPT | Performed by: INTERNAL MEDICINE

## 2017-05-10 RX ORDER — PAROXETINE 10 MG/1
10 TABLET, FILM COATED ORAL DAILY
Qty: 30 TAB | Refills: 5 | Status: SHIPPED | OUTPATIENT
Start: 2017-05-10 | End: 2017-09-05

## 2017-05-10 RX ORDER — LISINOPRIL 10 MG/1
10 TABLET ORAL
Qty: 90 TAB | Refills: 3 | Status: SHIPPED | OUTPATIENT
Start: 2017-05-10

## 2017-05-10 ASSESSMENT — PAIN SCALES - GENERAL: PAINLEVEL: NO PAIN

## 2017-05-10 NOTE — PROGRESS NOTES
Chief Complaint   Patient presents with   • Results     labs   • Medication Refill     norco, lisinopril       HISTORY OF PRESENT ILLNESS: Patient is a 77 y.o. female established patient who presents today to discuss the medical issues below.    Degenerative lumbar disc  patient continues with the hydrocodone 1/2 bid.  Patient states 5/10 in am.  Worse with up and moving, no pain with sitting, 5-6/10 with starting to move, improves after about 1 hour, she is walking the dog daily.  Not following with Dr España.      Essential hypertension, benign  Not following at home, taking meds.  Denies headaches chest pain palpitations edema    Breast pain, left  Patient was seen for breast tenderness, US neg, stopped the estrogen and the breast now feels fine.  She has occasional 2 x a day hot flashes.      Menopause  Patient reports hot flashes with discontinuing the estrogen. No nocturnal hot flashes sleeping well     Vitamin D deficiency  Continues on supplementation.      Cerumen debris on tympanic membrane of left ear  Patient had some trouble with wax in her left ear.        Patient Active Problem List    Diagnosis Date Noted   • Menopause 05/10/2017   • Cerumen debris on tympanic membrane of left ear 05/10/2017   • Breast pain, left 04/10/2017   • Trigger ring finger of right hand 04/10/2017   • Chronic fatigue 07/28/2016   • Excessive cerumen in both ear canals 10/20/2015   • Abnormal EKG 05/19/2015   • Lumbosacral spondylosis without myelopathy 04/04/2015   • Pain of foot 03/20/2015   • Essential hypertension, benign 04/05/2013   • Degenerative lumbar disc 02/23/2012   • Vitamin D deficiency 03/17/2011   • Pure hypercholesterolemia 03/25/2010   • Symptomatic menopausal or female climacteric states    • Osteoarthritis of multiple joints    • Allergic rhinitis        Allergies:Gabapentin (phn); Pcn; and Tramadol    Current Outpatient Prescriptions   Medication Sig Dispense Refill   • paroxetine (PAXIL) 10 MG Tab Take  1 Tab by mouth every day. 30 Tab 5   • Hydrocodone-Acetaminophen 2.5-325 MG Tab Take 1 Tab by mouth 3 times a day as needed. 90 Tab 0   • lisinopril (PRINIVIL) 10 MG Tab Take 1 Tab by mouth every day. 90 Tab 3   • therapeutic multivitamin-minerals (THERAGRAN-M) Tab Take 1 Tab by mouth every day.     • vitamin D (CHOLECALCIFEROL) 1000 UNIT Tab Take 1,000 Units by mouth every day.     • vitamin E (VITAMIN E) 1000 UNIT Cap Take 1 Cap by mouth every day.     • thiamine (THIAMINE) 100 MG Tab Take 100 mg by mouth every day.       No current facility-administered medications for this visit.         Past Medical History   Diagnosis Date   • Symptomatic menopausal or female climacteric states    • Essential hypertension, malignant    • Screening mammogram 2009     normal   • Allergic rhinitis, cause unspecified    • Osteoarthrosis involving, or with mention of more than one site, but not specified as generalized, multiple sites    • Hypokalemia 3/25/2010   • Pure hypercholesterolemia 3/25/2010   • Neuritis/radiculitis due to displacement of lumbar intervertebral disc 2012   • Essential hypertension, benign 2013   • Pain of foot 3/20/2015   • Pain 4/2/15     legs, mostly right, into foot   • Anesthesia      PONV   • Urinary bladder disorder      nocturia   • Dental disorder      partials, upper and lower   • Abnormal EKG 2015   • Excessive cerumen in both ear canals 10/20/2015   • Chronic fatigue 2016   • Menopause 5/10/2017   • Cerumen debris on tympanic membrane of left ear 5/10/2017       Social History   Substance Use Topics   • Smoking status: Never Smoker    • Smokeless tobacco: Never Used   • Alcohol Use: No       Family Status   Relation Status Death Age   • Mother  59     CVA,CAD   • Father  82     cirrhosis of live     Family History   Problem Relation Age of Onset   • Heart Disease Mother      CAD   • Hypertension Mother    • Stroke Mother        ROS:    Respiratory: Negative  "for cough, sputum production, shortness of breath or wheezing.    Cardiovascular: Negative for chest pain, palpitations, orthopnea, dyspnea with exertion or edema.   Gastrointestinal: Negative for GI upset, nausea, vomiting, abdominal pain, constipation or diarrhea.   Genitourinary: Negative for dysuria, urgency, hesitancy or frequency.       Exam:    Blood pressure 140/86, pulse 68, temperature 37.2 °C (99 °F), resp. rate 16, height 1.6 m (5' 2.99\"), weight 74.39 kg (164 lb), SpO2 98 %.  General:  Well nourished, well developed female in NAD.  HENT: Left TM partially occluded with cerumen right is clear nasal and oral mucosa with no lesions Spine Spine: Diffuse low lumbar discomfort negative straight leg testing   Pulmonary: Clear to ausculation and percussion.  Normal effort. No rales, rhonchi, or wheezing.  Cardiovascular: Regular rate and rhythm without murmur.   Abdomen: Normal bowel sounds soft and nontender no palpable liver spleen bladder mass.  Extremities: No LE edema noted.  Neuro: Grossly nonfocal.  Psych: Alert and oriented to person, place, and time. Appropriate mood and conversation.    LABS: Results reviewed and discussed with the patient, questions answered.      This dictation was created using voice recognition software. I have made reasonable attempts to correct errors, however, errors of grammar and content may exist.          Assessment/Plan:    1. Degenerative lumbar disc  Patient at baseline she is utilizing about one hydrocodone a day total. Discussed trial utilizing anti-inflammatories initially and follow with hydrocodone only as necessary. Start Paxil 10 mg one a day. Encouraged consideration for injections when necessary persistence has been seen by Dr. España.  No evidence of adverse reaction or abuse.    2. Essential hypertension, benign  Acceptable level of control on lisinopril for this age no change refills written    3. Menopause  Discussed applications of estrogen. Monitor with " Paxil trial    4. Breast pain, left  Resolved off estrogens negative mammogram and ultrasound reviewed with patient    5. Vitamin D deficiency  Adequately replaced continue supplementation    6. Lumbosacral spondylosis without myelopathy  Back pain as above 3 month follow-up  - Hydrocodone-Acetaminophen 2.5-325 MG Tab; Take 1 Tab by mouth 3 times a day as needed.  Dispense: 90 Tab; Refill: 0    7. Cerumen debris on tympanic membrane of left ear  Lavage today.    Patient was seen for  25 minutes face to face of which more than 50% of the time was spent in counseling and coordination of care regarding the above problems.

## 2017-05-10 NOTE — MR AVS SNAPSHOT
"        Julienne Bradley   5/10/2017 10:20 AM   Office Visit   MRN: [id number]    Department:  [facility]   Dept Phone:  [phone]    Description:  Female : 1940   Provider:  Marbella LEYVA M.D.           Reason for Visit     Results labs    Medication Refill norco, lisinopril      Allergies as of 5/10/2017     Allergen Noted Reactions    Gabapentin (Phn) 2015   Unspecified    Patient stated that she believes medication made it hard for her to stand alone and use restroom.     Pcn [Penicillins] 2009   Rash    Tramadol 2015       dizzy      You were diagnosed with     Degenerative lumbar disc   [733687]       Essential hypertension, benign   [401.1.ICD-9-CM]       Menopause   [787737]       Breast pain, left   [260463]       Vitamin D deficiency   [5702340]       Lumbosacral spondylosis without myelopathy   [721.3.ICD-9-CM]       Cerumen debris on tympanic membrane of left ear   [8034060]         Vital Signs     Blood Pressure Pulse Temperature Respirations Height Weight    140/86 mmHg 68 37.2 °C (99 °F) 16 1.6 m (5' 2.99\") 74.39 kg (164 lb)    Body Mass Index Oxygen Saturation Smoking Status             29.06 kg/m2 98% Never Smoker          Basic Information     Date Of Birth Sex Race Ethnicity Preferred Language    1940 Female White Non- English      Your appointments     Sep 08, 2017  9:30 AM   Established Patient with Marbella LEYVA M.D.   [facility] (--)    [address] [address] [phone]           You will be receiving a confirmation call a few days before your appointment from our automated call confirmation system.              Problem List              ICD-10-CM Priority Class Noted - Resolved    Symptomatic menopausal or female climacteric states N95.1   Unknown - Present    Osteoarthritis of multiple joints M15.9   Unknown - Present    Allergic rhinitis J30.9   Unknown - Present    Pure " hypercholesterolemia (Chronic) E78.00   3/25/2010 - Present    Vitamin D deficiency E55.9   3/17/2011 - Present    Degenerative lumbar disc M51.36   2/23/2012 - Present    Essential hypertension, benign (Chronic) I10   4/5/2013 - Present    Pain of foot M79.673   3/20/2015 - Present    Lumbosacral spondylosis without myelopathy M47.817   4/4/2015 - Present    Abnormal EKG R94.31   5/19/2015 - Present    Excessive cerumen in both ear canals H61.23   10/20/2015 - Present    Chronic fatigue R53.82   7/28/2016 - Present    Breast pain, left N64.4   4/10/2017 - Present    Trigger ring finger of right hand M65.341   4/10/2017 - Present    Menopause Z78.0   5/10/2017 - Present    Cerumen debris on tympanic membrane of left ear H61.22   5/10/2017 - Present      Health Maintenance        Date Due Completion Dates    IMM ZOSTER VACCINE 4/19/2001 ---    BONE DENSITY 4/19/2006 ---    IMM PNEUMOCOCCAL 65+ (ADULT) LOW/MEDIUM RISK SERIES (2 of 2 - PCV13) 4/6/2010 4/6/2009    MAMMOGRAM 4/17/2018 4/17/2017    COLONOSCOPY 4/5/2023 4/5/2013 (Declined)    Override on 4/5/2013: Patient Declined (Pt refuses completely)    IMM DTaP/Tdap/Td Vaccine (2 - Td) 2/8/2027 2/8/2017            Current Immunizations     Influenza Vaccine Quad Inj (Pf) 10/9/2014    MMR Vaccine 3/8/2017    Pneumococcal polysaccharide vaccine (PPSV-23) 4/6/2009    Tdap Vaccine 2/8/2017      Below and/or attached are the medications your provider expects you to take. Review all of your home medications and newly ordered medications with your provider and/or pharmacist. Follow medication instructions as directed by your provider and/or pharmacist. Please keep your medication list with you and share with your provider. Update the information when medications are discontinued, doses are changed, or new medications (including over-the-counter products) are added; and carry medication information at all times in the event of emergency situations     Allergies:  GABAPENTIN  (PHN) - Unspecified     PCN - Rash     TRAMADOL - (reactions not documented)               Medications  Valid as of: May 10, 2017 - 11:51 AM    Generic Name Brand Name Tablet Size Instructions for use    Cholecalciferol (Tab) cholecalciferol 1000 UNIT Take 1,000 Units by mouth every day.        Hydrocodone-Acetaminophen (Tab) Hydrocodone-Acetaminophen 2.5-325 MG Take 1 Tab by mouth 3 times a day as needed.        Lisinopril (Tab) PRINIVIL 10 MG Take 1 Tab by mouth every day.        Multiple Vitamins-Minerals (Tab) THERAGRAN-M  Take 1 Tab by mouth every day.        PARoxetine HCl (Tab) PAXIL 10 MG Take 1 Tab by mouth every day.        Thiamine HCl (Tab) THIAMINE 100 MG Take 100 mg by mouth every day.        Vitamin E (Cap) VITAMIN E 1000 UNIT Take 1 Cap by mouth every day.        .                 Medicines prescribed today were sent to:     [facility] [address] [place] [address] [place] [address] [facility] [address] [place] [address]    Phone: [phone] Fax: [phone]    Open 24 Hours?: No      Medication refill instructions:       If your prescription bottle indicates you have medication refills left, it is not necessary to call your provider’s office. Please contact your pharmacy and they will refill your medication.    If your prescription bottle indicates you do not have any refills left, you may request refills at any time through one of the following ways: The online [employer] system (except Urgent Care), by calling your provider’s office, or by asking your pharmacy to contact your provider’s office with a refill request. Medication refills are processed only during regular business hours and may not be available until the next business day. Your provider may request additional information or to have a follow-up visit with you prior to refilling your medication.   *Please Note: Medication refills are assigned a new Rx number when refilled electronically. Your pharmacy may indicate  that no refills were authorized even though a new prescription for the same medication is available at the pharmacy. Please request the medicine by name with the pharmacy before contacting your provider for a refill.        Instructions      Trial ibuprofen instead of the hydrocodone at first  Ok to take the hydrocodone if needed    Paroxitine 10 mg 1 a day.      Heat for your back and your finger will help.    3 month follow-up so we can keep the hydrocodone prescription current.       Other Notes About Your Plan     ER visit for dizzy, signed AMA, CT brain neg, Carotids neg, Echo EF 65%, Cxr increased heart size 10/13/14 [place]           MyChart Status: Patient Declined

## 2017-05-10 NOTE — PATIENT INSTRUCTIONS
Trial ibuprofen instead of the hydrocodone at first  Ok to take the hydrocodone if needed    Paroxitine 10 mg 1 a day.      Heat for your back and your finger will help.    3 month follow-up so we can keep the hydrocodone prescription current.

## 2017-05-10 NOTE — ASSESSMENT & PLAN NOTE
Patient was seen for breast tenderness, US neg, stopped the estrogen and the breast now feels fine.  She has occasional 2 x a day hot flashes.

## 2017-05-11 ENCOUNTER — TELEPHONE (OUTPATIENT)
Dept: MEDICAL GROUP | Facility: PHYSICIAN GROUP | Age: 77
End: 2017-05-11

## 2017-05-11 DIAGNOSIS — E78.00 PURE HYPERCHOLESTEROLEMIA: Chronic | ICD-10-CM

## 2017-05-11 NOTE — TELEPHONE ENCOUNTER
1. Caller Name: Sharon                      Call Back Number: [phone]    2. Message: Patient called and wondered why she did not have her LDL and HDL checked this last time that she had labs drawn.  She stated last time that her LDLwas high so she wanted to know why they were not ordered on her last blood draw?  Patient was seen by  yesterday on 5/11/17.      3. Patient approves office to leave a detailed voicemail/MyChart message: N\A

## 2017-05-12 NOTE — TELEPHONE ENCOUNTER
This was probably an oversight.    The LDL in Oct was 109, this is only slightly elevated so I don't think we need a special draw for this unless the patient has her heart set on it.  She had improved this from 125 in 2015 so I would recommend she continue what she is doing with her diet and we can check prior to the OV in Sept.   If she wants however, she can come get that drawn earlier, I have ordered  Please apologize for the oversight.

## 2017-05-18 ENCOUNTER — HOSPITAL ENCOUNTER (OUTPATIENT)
Dept: LAB | Facility: MEDICAL CENTER | Age: 77
End: 2017-05-18
Attending: INTERNAL MEDICINE
Payer: MEDICARE

## 2017-05-18 DIAGNOSIS — E78.00 PURE HYPERCHOLESTEROLEMIA: Chronic | ICD-10-CM

## 2017-05-18 LAB
CHOLEST SERPL-MCNC: 194 MG/DL (ref 100–199)
HDLC SERPL-MCNC: 65 MG/DL
LDLC SERPL CALC-MCNC: 100 MG/DL
TRIGL SERPL-MCNC: 147 MG/DL (ref 0–149)

## 2017-05-18 PROCEDURE — 36415 COLL VENOUS BLD VENIPUNCTURE: CPT

## 2017-05-18 PROCEDURE — 80061 LIPID PANEL: CPT

## 2017-07-20 DIAGNOSIS — M51.36 DEGENERATIVE LUMBAR DISC: ICD-10-CM

## 2017-07-20 RX ORDER — MELOXICAM 7.5 MG/1
7.5 TABLET ORAL DAILY
Qty: 90 TAB | Refills: 0 | Status: SHIPPED | OUTPATIENT
Start: 2017-07-20 | End: 2017-09-05 | Stop reason: SDUPTHER

## 2017-07-20 NOTE — TELEPHONE ENCOUNTER
Was the patient seen in the last year in this department? Yes     Does patient have an active prescription for medications requested? No     Received Request Via: Pharmacy      Pt met protocol?: Yes, OV 5/17, pt decision to D/C 11/16, requesting now.

## 2017-09-05 ENCOUNTER — OFFICE VISIT (OUTPATIENT)
Dept: MEDICAL GROUP | Facility: PHYSICIAN GROUP | Age: 77
End: 2017-09-05
Payer: MEDICARE

## 2017-09-05 VITALS
RESPIRATION RATE: 12 BRPM | BODY MASS INDEX: 28.7 KG/M2 | WEIGHT: 162 LBS | SYSTOLIC BLOOD PRESSURE: 126 MMHG | DIASTOLIC BLOOD PRESSURE: 74 MMHG | HEIGHT: 63 IN | OXYGEN SATURATION: 95 % | HEART RATE: 76 BPM | TEMPERATURE: 99.3 F

## 2017-09-05 DIAGNOSIS — M47.817 LUMBOSACRAL SPONDYLOSIS WITHOUT MYELOPATHY: ICD-10-CM

## 2017-09-05 DIAGNOSIS — M51.36 DEGENERATIVE LUMBAR DISC: ICD-10-CM

## 2017-09-05 DIAGNOSIS — E55.9 VITAMIN D DEFICIENCY: ICD-10-CM

## 2017-09-05 DIAGNOSIS — E78.5 HYPERLIPIDEMIA, UNSPECIFIED HYPERLIPIDEMIA TYPE: ICD-10-CM

## 2017-09-05 DIAGNOSIS — R53.82 CHRONIC FATIGUE: ICD-10-CM

## 2017-09-05 DIAGNOSIS — M65.341 TRIGGER RING FINGER OF RIGHT HAND: ICD-10-CM

## 2017-09-05 PROCEDURE — 99214 OFFICE O/P EST MOD 30 MIN: CPT | Performed by: INTERNAL MEDICINE

## 2017-09-05 RX ORDER — MELOXICAM 7.5 MG/1
7.5 TABLET ORAL DAILY
Qty: 90 TAB | Refills: 2 | Status: SHIPPED | OUTPATIENT
Start: 2017-09-05 | End: 2018-01-25

## 2017-09-05 RX ORDER — HYDROCODONE BITARTRATE AND ACETAMINOPHEN 5; 325 MG/1; MG/1
TABLET ORAL
Qty: 45 TAB | Refills: 0 | Status: SHIPPED | OUTPATIENT
Start: 2017-09-05 | End: 2017-10-09

## 2017-09-05 NOTE — ASSESSMENT & PLAN NOTE
Reports she has taken a bit more of the hydrocodone.  She has noted the meloxicam has helped with the am stiffness and the hand.  She reports she is taking the hydrocodone about 1/2 bid currently.  She does use some tylenol  In addition and that has helped.

## 2017-09-05 NOTE — ASSESSMENT & PLAN NOTE
Patient worried as she read a report at home which mentions a large heart.  She cannot recall when or what report, she thought it was with the US of the breast.  She denies no chest pain or sob, no swelling.

## 2017-09-05 NOTE — PROGRESS NOTES
Chief Complaint   Patient presents with   • Back Pain     fv / trigger fingers       HISTORY OF PRESENT ILLNESS: Patient is a 77 y.o. female established patient who presents today to discuss the medical issues below.    Trigger ring finger of right hand  Patient reports she did not go to see ortho at [place].  States that taking the meloxicam has helped a lot.      Chronic fatigue  Patient worried as she read a report at home which mentions a large heart.  She cannot recall when or what report, she thought it was with the US of the breast.  She denies no chest pain or sob, no swelling.      Degenerative lumbar disc  Reports she has taken a bit more of the hydrocodone.  She has noted the meloxicam has helped with the am stiffness and the hand.  She reports she is taking the hydrocodone about 1/2 bid currently.  She does use some tylenol  In addition and that has helped.       Patient Active Problem List    Diagnosis Date Noted   • Trigger ring finger of right hand 04/10/2017     Priority: High   • Chronic fatigue 07/28/2016     Priority: High   • Lumbosacral spondylosis without myelopathy 04/04/2015     Priority: High   • Degenerative lumbar disc 02/23/2012     Priority: High   • Osteoarthritis of multiple joints      Priority: High   • Essential hypertension, benign 04/05/2013     Priority: Medium   • Pure hypercholesterolemia 03/25/2010     Priority: Medium   • Menopause 05/10/2017   • Cerumen debris on tympanic membrane of left ear 05/10/2017   • Breast pain, left 04/10/2017   • Excessive cerumen in both ear canals 10/20/2015   • Abnormal EKG 05/19/2015   • Pain of foot 03/20/2015   • Vitamin D deficiency 03/17/2011   • Symptomatic menopausal or female climacteric states    • Allergic rhinitis        Allergies:Gabapentin (phn); Pcn [penicillins]; and Tramadol    Current Outpatient Prescriptions   Medication Sig Dispense Refill   • meloxicam (MOBIC) 7.5 MG Tab Take 1 Tab by mouth every day. 90 Tab 2   •  Hydrocodone-Acetaminophen 2.5-325 MG Tab Take 1 Tab by mouth 3 times a day as needed. 45 Tab 0   • lisinopril (PRINIVIL) 10 MG Tab Take 1 Tab by mouth every day. 90 Tab 3   • therapeutic multivitamin-minerals (THERAGRAN-M) Tab Take 1 Tab by mouth every day.     • vitamin D (CHOLECALCIFEROL) 1000 UNIT Tab Take 1,000 Units by mouth every day.     • vitamin E (VITAMIN E) 1000 UNIT Cap Take 1 Cap by mouth every day.     • thiamine (THIAMINE) 100 MG Tab Take 100 mg by mouth every day.       No current facility-administered medications for this visit.          Past Medical History:   Diagnosis Date   • Menopause 5/10/2017   • Cerumen debris on tympanic membrane of left ear 5/10/2017   • Chronic fatigue 2016   • Excessive cerumen in both ear canals 10/20/2015   • Abnormal EKG 2015   • Pain 4/2/15    legs, mostly right, into foot   • Pain of foot 3/20/2015   • Essential hypertension, benign 2013   • Neuritis/radiculitis due to displacement of lumbar intervertebral disc 2012   • Hypokalemia 3/25/2010   • Pure hypercholesterolemia 3/25/2010   • Screening mammogram 2009    normal   • Allergic rhinitis, cause unspecified    • Anesthesia     PONV   • Dental disorder     partials, upper and lower   • Essential hypertension, malignant    • Osteoarthrosis involving, or with mention of more than one site, but not specified as generalized, multiple sites    • Symptomatic menopausal or female climacteric states    • Urinary bladder disorder     nocturia       Social History   Substance Use Topics   • Smoking status: Never Smoker   • Smokeless tobacco: Never Used   • Alcohol use No       Family Status   Relation Status   • Mother  at age 59    CVA,CAD   • Father  at age 82    cirrhosis of live     Family History   Problem Relation Age of Onset   • Heart Disease Mother      CAD   • Hypertension Mother    • Stroke Mother        ROS:    Respiratory: Negative for cough, sputum production, shortness  "of breath or wheezing.    Cardiovascular: Negative for chest pain, palpitations, orthopnea, dyspnea with exertion or edema.   Gastrointestinal: Negative for GI upset, nausea, vomiting, abdominal pain, constipation or diarrhea.   Genitourinary: Negative for dysuria, urgency, hesitancy or frequency.       Exam:    Blood pressure 126/74, pulse 76, temperature 37.4 °C (99.3 °F), resp. rate 12, height 1.6 m (5' 3\"), weight 73.5 kg (162 lb), SpO2 95 %.  General:  Well nourished, well developed female in NAD.  Pulmonary: Clear to ausculation and percussion.  Normal effort. No rales, rhonchi, or wheezing.  Cardiovascular: Regular rate and rhythm without murmur.   Abdomen: Normal bowel sounds soft and nontender no palpable liver spleen bladder mass.  Extremities: No LE edema noted.  Neuro: Grossly nonfocal.  Psych: Alert and oriented to person, place, and time. Appropriate mood and conversation.    Reviewed all records and imaging available from [facility].    This dictation was created using voice recognition software. I have made reasonable attempts to correct errors, however, errors of grammar and content may exist.          Assessment/Plan:    1. Trigger ring finger of right hand  Much improved with ongoing meloxicam. She is avoiding other nonsteroidals. Occasionally uses Tylenol. Declines referral to orthopedic for any additional intervention    2. Chronic fatigue  Per patient she is at baseline no chest pain palpitations edema. I'm unable to find any reference to cardiomegaly except on 2015 chest x-ray which indicated only mildly increased heart size. She is adamant this is been noted more recently. I've asked her to bring a copy of the report that is causing her so much concern. She is chronically stable.  - COMP METABOLIC PANEL; Future  - CBC WITH DIFFERENTIAL; Future    3. Degenerative lumbar disc  Per patient she is improved on the Mobic. She wants to know why the dose was changed from a 15 mg dose. On review " of records actually been several years since we dispensed 15 mg. Reviewed with the patient since she is having reasonable response with 7.5 mg refills on the lower doses are given.  - meloxicam (MOBIC) 7.5 MG Tab; Take 1 Tab by mouth every day.  Dispense: 90 Tab; Refill: 2    4. Lumbosacral spondylosis without myelopathy  Patient continues to utilize a half of a tablet as needed. She has what she estimates to be half of a 90 tablet bottle still left over from 5 months ago. She does not want to have to come back in to be seen again in 3 months. I've given her a short refill to evaluate medication use. Discussed at length the implications of narcotics, risks, refill requirements.  - Hydrocodone-Acetaminophen 2.5-325 MG Tab; Take 1 Tab by mouth 3 times a day as needed.  Dispense: 45 Tab; Refill: 0    5. Vitamin D deficiency  Continues on supplementation discussed every 6 months labs  - VITAMIN D,25 HYDROXY; Future    6. Hyperlipidemia, unspecified hyperlipidemia type  Reassured and reviewed the prior labs currently on no medications she is concerned and wants to continue monitoring.  - LIPID PROFILE; Future       Patient was seen for  25 minutes face to face of which more than 50% of the time was spent in counseling and coordination of care regarding the above problems.

## 2017-09-05 NOTE — ASSESSMENT & PLAN NOTE
Patient reports she did not go to see ortho at [place].  States that taking the meloxicam has helped a lot.

## 2017-10-09 ENCOUNTER — OFFICE VISIT (OUTPATIENT)
Dept: URGENT CARE | Facility: PHYSICIAN GROUP | Age: 77
End: 2017-10-09
Payer: MEDICARE

## 2017-10-09 VITALS
RESPIRATION RATE: 16 BRPM | BODY MASS INDEX: 29.58 KG/M2 | TEMPERATURE: 98.9 F | SYSTOLIC BLOOD PRESSURE: 142 MMHG | DIASTOLIC BLOOD PRESSURE: 62 MMHG | HEART RATE: 69 BPM | OXYGEN SATURATION: 97 % | WEIGHT: 167 LBS

## 2017-10-09 DIAGNOSIS — R21 RASH: ICD-10-CM

## 2017-10-09 PROCEDURE — 99214 OFFICE O/P EST MOD 30 MIN: CPT | Performed by: PHYSICIAN ASSISTANT

## 2017-10-09 RX ORDER — PREDNISONE 10 MG/1
TABLET ORAL
Qty: 20 TAB | Refills: 0 | Status: SHIPPED | OUTPATIENT
Start: 2017-10-09 | End: 2017-12-18

## 2017-10-09 ASSESSMENT — ENCOUNTER SYMPTOMS
MYALGIAS: 0
CHILLS: 0
FATIGUE: 0
FEVER: 0

## 2017-10-09 NOTE — PROGRESS NOTES
Subjective:      Julienne Bradley is a 77 y.o. female who presents with Rash (x1mon bilateral arms, knees, R foot redness, itching)            Patient presents today with raised, erythematous rash which started on her upper extremities and has since spread to her legs also. Reports pruritus. Daughter reports that it looks like eczema. They tried using some cortisone topical and Vaseline last night with some improvement in symptoms. Denies any known exposures.      Rash   This is a new problem. The current episode started more than 1 month ago. The problem has been waxing and waning since onset. Location: upper and lower extremities. The rash is characterized by redness, itchiness and dryness. She was exposed to nothing. Pertinent negatives include no fatigue or fever. Past treatments include topical steroids. The treatment provided mild relief.       Review of Systems   Constitutional: Negative for chills, fatigue and fever.   Musculoskeletal: Negative for myalgias.   Skin: Positive for itching and rash.     Allergies:Gabapentin (phn); Pcn [penicillins]; and Tramadol    Current Outpatient Prescriptions Ordered in [facility]   Medication Sig Dispense Refill   • predniSONE (DELTASONE) 10 MG Tab 2 tabs BID x 2 days, 3 tabs daily x 2 days, 2 tabs daily x 2 days, 1 tab daily x 2 days 20 Tab 0   • meloxicam (MOBIC) 7.5 MG Tab Take 1 Tab by mouth every day. 90 Tab 2   • Hydrocodone-Acetaminophen 2.5-325 MG Tab Take 1 Tab by mouth 3 times a day as needed. 45 Tab 0   • lisinopril (PRINIVIL) 10 MG Tab Take 1 Tab by mouth every day. 90 Tab 3   • therapeutic multivitamin-minerals (THERAGRAN-M) Tab Take 1 Tab by mouth every day.     • vitamin D (CHOLECALCIFEROL) 1000 UNIT Tab Take 1,000 Units by mouth every day.     • vitamin E (VITAMIN E) 1000 UNIT Cap Take 1 Cap by mouth every day.     • thiamine (THIAMINE) 100 MG Tab Take 100 mg by mouth every day.       No current [facility]-ordered facility-administered medications on file.         Past Medical History:   Diagnosis Date   • Menopause 5/10/2017   • Cerumen debris on tympanic membrane of left ear 5/10/2017   • Chronic fatigue 2016   • Excessive cerumen in both ear canals 10/20/2015   • Abnormal EKG 2015   • Pain 4/2/15    legs, mostly right, into foot   • Pain of foot 3/20/2015   • Essential hypertension, benign 2013   • Neuritis/radiculitis due to displacement of lumbar intervertebral disc 2012   • Hypokalemia 3/25/2010   • Pure hypercholesterolemia 3/25/2010   • Screening mammogram 2009    normal   • Allergic rhinitis, cause unspecified    • Anesthesia     PONV   • Dental disorder     partials, upper and lower   • Essential hypertension, malignant    • Osteoarthrosis involving, or with mention of more than one site, but not specified as generalized, multiple sites    • Symptomatic menopausal or female climacteric states    • Urinary bladder disorder     nocturia       Social History   Substance Use Topics   • Smoking status: Never Smoker   • Smokeless tobacco: Never Used   • Alcohol use No       Family Status   Relation Status   • Mother  at age 59    CVA,CAD   • Father  at age 82    cirrhosis of live     Family History   Problem Relation Age of Onset   • Heart Disease Mother      CAD   • Hypertension Mother    • Stroke Mother           Objective:     /62   Pulse 69   Temp 37.2 °C (98.9 °F)   Resp 16   Wt 75.8 kg (167 lb)   SpO2 97%   Breastfeeding? No   BMI 29.58 kg/m²      Physical Exam   Constitutional: She is oriented to person, place, and time. She appears well-developed and well-nourished. No distress.   HENT:   Head: Normocephalic and atraumatic.   Neck: Normal range of motion. Neck supple.   Cardiovascular: Normal rate.    Pulmonary/Chest: Effort normal.   Neurological: She is alert and oriented to person, place, and time.   Skin: Skin is warm and dry. Rash (scattered, raised, erythematous, irregularly shaped rash on  extremities) noted. She is not diaphoretic.   Psychiatric: She has a normal mood and affect. Her behavior is normal. Judgment and thought content normal.   Nursing note and vitals reviewed.              Assessment/Plan:     1. Rash  predniSONE (DELTASONE) 10 MG Tab    Fluctuating for about a month. Discussed options. Trial of prednisone. Follow-up with PCP. Return if worsening       [employer]vier Interactive Patient Education given:Rash    Please note that this dictation was created using voice recognition software. I have made every reasonable attempt to correct obvious errors, but I expect that there are errors of grammar and possibly content that I did not discover before finalizing the note.

## 2017-10-09 NOTE — PATIENT INSTRUCTIONS
Rash  A rash is a change in the color or texture of the skin. There are many different types of rashes. You may have other problems that accompany your rash.  CAUSES   · Infections.  · Allergic reactions. This can include allergies to pets or foods.  · Certain medicines.  · Exposure to certain chemicals, soaps, or cosmetics.  · Heat.  · Exposure to poisonous plants.  · Tumors, both cancerous and noncancerous.  SYMPTOMS   · Redness.  · Scaly skin.  · Itchy skin.  · Dry or cracked skin.  · Bumps.  · Blisters.  · Pain.  DIAGNOSIS   Your caregiver may do a physical exam to determine what type of rash you have. A skin sample (biopsy) may be taken and examined under a microscope.  TREATMENT   Treatment depends on the type of rash you have. Your caregiver may prescribe certain medicines. For serious conditions, you may need to see a skin doctor (dermatologist).  HOME CARE INSTRUCTIONS   · Avoid the substance that caused your rash.  · Do not scratch your rash. This can cause infection.  · You may take cool baths to help stop itching.  · Only take over-the-counter or prescription medicines as directed by your caregiver.  · Keep all follow-up appointments as directed by your caregiver.  SEEK IMMEDIATE MEDICAL CARE IF:  · You have increasing pain, swelling, or redness.  · You have a fever.  · You have new or severe symptoms.  · You have body aches, diarrhea, or vomiting.  · Your rash is not better after 3 days.  MAKE SURE YOU:  · Understand these instructions.  · Will watch your condition.  · Will get help right away if you are not doing well or get worse.     This information is not intended to replace advice given to you by your health care provider. Make sure you discuss any questions you have with your health care provider.     Document Released: 12/08/2003 Document Revised: 01/08/2016 Document Reviewed: 10/01/2012  [employer] Interactive Patient Education ©2016 [employer]

## 2017-11-20 DIAGNOSIS — M47.817 LUMBOSACRAL SPONDYLOSIS WITHOUT MYELOPATHY: ICD-10-CM

## 2017-11-29 DIAGNOSIS — M47.817 LUMBOSACRAL SPONDYLOSIS WITHOUT MYELOPATHY: ICD-10-CM

## 2017-11-30 NOTE — TELEPHONE ENCOUNTER
Patient needs OV  Please remind her she signed the controlled substance agreement Sept 2017, no refills without ov

## 2017-12-06 NOTE — TELEPHONE ENCOUNTER
Pt said that she found her hydrocodone bottle so she still has some for now.  She does not need a refill

## 2017-12-18 ENCOUNTER — OFFICE VISIT (OUTPATIENT)
Dept: URGENT CARE | Facility: PHYSICIAN GROUP | Age: 77
End: 2017-12-18
Payer: MEDICARE

## 2017-12-18 VITALS
TEMPERATURE: 97.8 F | SYSTOLIC BLOOD PRESSURE: 126 MMHG | DIASTOLIC BLOOD PRESSURE: 70 MMHG | HEART RATE: 74 BPM | BODY MASS INDEX: 29.23 KG/M2 | RESPIRATION RATE: 16 BRPM | WEIGHT: 165 LBS | HEIGHT: 63 IN | OXYGEN SATURATION: 97 %

## 2017-12-18 DIAGNOSIS — L29.8 PRURITIC ERYTHEMATOUS RASH: ICD-10-CM

## 2017-12-18 PROCEDURE — 99214 OFFICE O/P EST MOD 30 MIN: CPT | Performed by: PHYSICIAN ASSISTANT

## 2017-12-18 RX ORDER — PREDNISONE 10 MG/1
TABLET ORAL
Qty: 20 TAB | Refills: 0 | Status: SHIPPED | OUTPATIENT
Start: 2017-12-18 | End: 2023-08-30

## 2017-12-18 ASSESSMENT — ENCOUNTER SYMPTOMS
MYALGIAS: 0
CHILLS: 0
FATIGUE: 0
FEVER: 0

## 2017-12-18 NOTE — PROGRESS NOTES
Subjective:      Julienne Bradley is a 77 y.o. female who presents with Rash (x1wk  bilateral arm, R leg/abd redness, itching)            Pruritic, erythematous, diffuse rash for about a week. Similar episode a couple of months ago which resolved with prednisone. No known exposures.      Rash   This is a new problem. The current episode started in the past 7 days. The problem has been gradually worsening since onset. The rash is diffuse. The rash is characterized by redness, dryness and itchiness. She was exposed to nothing. Pertinent negatives include no fatigue, fever or joint pain. Past treatments include nothing. The treatment provided no relief.       Review of Systems   Constitutional: Negative for chills, fatigue and fever.   Musculoskeletal: Negative for joint pain and myalgias.   Skin: Positive for itching and rash.     Allergies:Gabapentin (phn); Pcn [penicillins]; and Tramadol    Current Outpatient Prescriptions Ordered in [facility]   Medication Sig Dispense Refill   • predniSONE (DELTASONE) 10 MG Tab 2 tabs BID x 2 days, 3 tabs daily x 2 days, 2 tabs daily x 2 days, 1 tab daily x 2 days 20 Tab 0   • meloxicam (MOBIC) 7.5 MG Tab Take 1 Tab by mouth every day. 90 Tab 2   • lisinopril (PRINIVIL) 10 MG Tab Take 1 Tab by mouth every day. 90 Tab 3   • therapeutic multivitamin-minerals (THERAGRAN-M) Tab Take 1 Tab by mouth every day.     • vitamin D (CHOLECALCIFEROL) 1000 UNIT Tab Take 1,000 Units by mouth every day.     • vitamin E (VITAMIN E) 1000 UNIT Cap Take 1 Cap by mouth every day.     • thiamine (THIAMINE) 100 MG Tab Take 100 mg by mouth every day.       No current [facility]-ordered facility-administered medications on file.        Past Medical History:   Diagnosis Date   • Abnormal EKG 5/19/2015   • Allergic rhinitis, cause unspecified    • Anesthesia     PONV   • Cerumen debris on tympanic membrane of left ear 5/10/2017   • Chronic fatigue 7/28/2016   • Dental disorder     partials, upper and lower   •  "Essential hypertension, benign 2013   • Essential hypertension, malignant    • Excessive cerumen in both ear canals 10/20/2015   • Hypokalemia 3/25/2010   • Menopause 5/10/2017   • Neuritis/radiculitis due to displacement of lumbar intervertebral disc 2012   • Osteoarthrosis involving, or with mention of more than one site, but not specified as generalized, multiple sites    • Pain 4/2/15    legs, mostly right, into foot   • Pain of foot 3/20/2015   • Pure hypercholesterolemia 3/25/2010   • Screening mammogram 2009    normal   • Symptomatic menopausal or female climacteric states    • Urinary bladder disorder     nocturia       Social History   Substance Use Topics   • Smoking status: Never Smoker   • Smokeless tobacco: Never Used   • Alcohol use No       Family Status   Relation Status   • Mother  at age 59    CVA,CAD   • Father  at age 82    cirrhosis of live     Family History   Problem Relation Age of Onset   • Heart Disease Mother      CAD   • Hypertension Mother    • Stroke Mother           Objective:     /70   Pulse 74   Temp 36.6 °C (97.8 °F)   Resp 16   Ht 1.6 m (5' 2.99\")   Wt 74.8 kg (165 lb)   SpO2 97%   Breastfeeding? No   BMI 29.24 kg/m²      Physical Exam   Constitutional: She is oriented to person, place, and time. She appears well-developed and well-nourished. No distress.   HENT:   Head: Normocephalic and atraumatic.   Eyes: Right eye exhibits no discharge. Left eye exhibits no discharge.   Neck: Normal range of motion. Neck supple.   Cardiovascular: Normal rate.    Pulmonary/Chest: Effort normal.   Neurological: She is alert and oriented to person, place, and time.   Skin: Skin is warm and dry. She is not diaphoretic.   Scattered, erythematous rash with several areas of excoriation but no signs of secondary infection   Psychiatric: She has a normal mood and affect. Her behavior is normal. Judgment and thought content normal.   Nursing note and vitals " reviewed.              Assessment/Plan:     1. Pruritic erythematous rash  predniSONE (DELTASONE) 10 MG Tab    Ongoing for about a week. Similar episode resolved with prednisone. Given prednisone. Follow-up with PCP. Return if worsening       [employer] Interactive Patient Education given:Rash    Please note that this dictation was created using voice recognition software. I have made every reasonable attempt to correct obvious errors, but I expect that there are errors of grammar and possibly content that I did not discover before finalizing the note.

## 2017-12-18 NOTE — PATIENT INSTRUCTIONS
Rash  A rash is a change in the color or texture of the skin. There are many different types of rashes. You may have other problems that accompany your rash.  CAUSES   · Infections.  · Allergic reactions. This can include allergies to pets or foods.  · Certain medicines.  · Exposure to certain chemicals, soaps, or cosmetics.  · Heat.  · Exposure to poisonous plants.  · Tumors, both cancerous and noncancerous.  SYMPTOMS   · Redness.  · Scaly skin.  · Itchy skin.  · Dry or cracked skin.  · Bumps.  · Blisters.  · Pain.  DIAGNOSIS   Your caregiver may do a physical exam to determine what type of rash you have. A skin sample (biopsy) may be taken and examined under a microscope.  TREATMENT   Treatment depends on the type of rash you have. Your caregiver may prescribe certain medicines. For serious conditions, you may need to see a skin doctor (dermatologist).  HOME CARE INSTRUCTIONS   · Avoid the substance that caused your rash.  · Do not scratch your rash. This can cause infection.  · You may take cool baths to help stop itching.  · Only take over-the-counter or prescription medicines as directed by your caregiver.  · Keep all follow-up appointments as directed by your caregiver.  SEEK IMMEDIATE MEDICAL CARE IF:  · You have increasing pain, swelling, or redness.  · You have a fever.  · You have new or severe symptoms.  · You have body aches, diarrhea, or vomiting.  · Your rash is not better after 3 days.  MAKE SURE YOU:  · Understand these instructions.  · Will watch your condition.  · Will get help right away if you are not doing well or get worse.     This information is not intended to replace advice given to you by your health care provider. Make sure you discuss any questions you have with your health care provider.     Document Released: 12/08/2003 Document Revised: 01/08/2016 Document Reviewed: 10/01/2012  [employer] Interactive Patient Education ©2016 [employer]

## 2018-01-09 ENCOUNTER — APPOINTMENT (RX ONLY)
Dept: URBAN - NONMETROPOLITAN AREA CLINIC 15 | Facility: CLINIC | Age: 78
Setting detail: DERMATOLOGY
End: 2018-01-09

## 2018-01-09 DIAGNOSIS — R21 RASH AND OTHER NONSPECIFIC SKIN ERUPTION: ICD-10-CM

## 2018-01-09 PROCEDURE — ? PRESCRIPTION

## 2018-01-09 PROCEDURE — ? COUNSELING

## 2018-01-09 PROCEDURE — 11101: CPT

## 2018-01-09 PROCEDURE — ? BIOPSY BY SHAVE METHOD

## 2018-01-09 PROCEDURE — 11100: CPT

## 2018-01-09 PROCEDURE — 99202 OFFICE O/P NEW SF 15 MIN: CPT | Mod: 25

## 2018-01-09 RX ORDER — TRIAMCINOLONE ACETONIDE 1 MG/G
CREAM TOPICAL
Qty: 1 | Refills: 3 | Status: ERX | COMMUNITY
Start: 2018-01-09

## 2018-01-09 RX ADMIN — TRIAMCINOLONE ACETONIDE: 1 CREAM TOPICAL at 23:56

## 2018-01-09 ASSESSMENT — LOCATION DETAILED DESCRIPTION DERM
LOCATION DETAILED: PERIUMBILICAL SKIN
LOCATION DETAILED: PERIUMBILICAL SKIN
LOCATION DETAILED: RIGHT DISTAL DORSAL FOREARM
LOCATION DETAILED: LEFT DISTAL DORSAL FOREARM
LOCATION DETAILED: LEFT PROXIMAL DORSAL FOREARM
LOCATION DETAILED: LEFT DISTAL POSTERIOR UPPER ARM
LOCATION DETAILED: RIGHT DISTAL POSTERIOR UPPER ARM

## 2018-01-09 ASSESSMENT — LOCATION SIMPLE DESCRIPTION DERM
LOCATION SIMPLE: RIGHT POSTERIOR UPPER ARM
LOCATION SIMPLE: ABDOMEN
LOCATION SIMPLE: ABDOMEN
LOCATION SIMPLE: LEFT POSTERIOR UPPER ARM
LOCATION SIMPLE: RIGHT FOREARM
LOCATION SIMPLE: LEFT FOREARM

## 2018-01-09 ASSESSMENT — LOCATION ZONE DERM
LOCATION ZONE: ARM
LOCATION ZONE: TRUNK
LOCATION ZONE: TRUNK

## 2018-01-24 ENCOUNTER — APPOINTMENT (RX ONLY)
Dept: URBAN - NONMETROPOLITAN AREA CLINIC 15 | Facility: CLINIC | Age: 78
Setting detail: DERMATOLOGY
End: 2018-01-24

## 2018-01-24 DIAGNOSIS — R21 RASH AND OTHER NONSPECIFIC SKIN ERUPTION: ICD-10-CM

## 2018-01-24 PROCEDURE — 11100: CPT

## 2018-01-24 PROCEDURE — 11101: CPT

## 2018-01-24 PROCEDURE — ? BIOPSY BY PUNCH METHOD

## 2018-01-24 PROCEDURE — ? COUNSELING

## 2018-01-24 PROCEDURE — ? ORDER TESTS

## 2018-01-24 ASSESSMENT — LOCATION ZONE DERM
LOCATION ZONE: TRUNK
LOCATION ZONE: LEG
LOCATION ZONE: ARM

## 2018-01-24 ASSESSMENT — LOCATION SIMPLE DESCRIPTION DERM
LOCATION SIMPLE: ABDOMEN
LOCATION SIMPLE: LEFT PRETIBIAL REGION
LOCATION SIMPLE: LEFT FOREARM
LOCATION SIMPLE: RIGHT THIGH
LOCATION SIMPLE: RIGHT FOREARM
LOCATION SIMPLE: LEFT THIGH
LOCATION SIMPLE: RIGHT PRETIBIAL REGION

## 2018-01-24 ASSESSMENT — LOCATION DETAILED DESCRIPTION DERM
LOCATION DETAILED: RIGHT LATERAL ABDOMEN
LOCATION DETAILED: LEFT VENTRAL PROXIMAL FOREARM
LOCATION DETAILED: LEFT ANTERIOR DISTAL THIGH
LOCATION DETAILED: LEFT PROXIMAL PRETIBIAL REGION
LOCATION DETAILED: RIGHT ANTERIOR DISTAL THIGH
LOCATION DETAILED: RIGHT PROXIMAL PRETIBIAL REGION
LOCATION DETAILED: RIGHT VENTRAL PROXIMAL FOREARM

## 2018-01-24 NOTE — PROCEDURE: BIOPSY BY PUNCH METHOD
Biopsy Type: DIF
Notification Instructions: Patient will be notified of biopsy results. However, patient instructed to call the office if not contacted within 2 weeks.
Punch Size In Mm: 4
Epidermal Sutures: 4-0 PGA
Bill 28353 For Specimen Handling/Conveyance To Laboratory?: no
Additional Anesthesia Volume In Cc (Will Not Render If 0): 0
Home Suture Removal Text: Patient was provided a home suture removal kit and will remove their sutures at home.  If they have any questions or difficulties they will call the office.
Suture Removal: 14 days
Consent: Written consent was obtained and risks were reviewed including but not limited to scarring, infection, bleeding, scabbing, incomplete removal, nerve damage and allergy to anesthesia.
Body Location Override (Optional - Billing Will Still Be Based On Selected Body Map Location If Applicable): right thigh-involved
Lab: [id number]
Number Of Epidermal Sutures (Optional): 2
Hemostasis: None
Wound Care: Vaseline
Anesthesia Volume In Cc: 0.5
Anesthesia Type: 1% lidocaine with epinephrine
Lab Facility: [id number]
Detail Level: Detailed
Post-Care Instructions: I reviewed with the patient in detail post-care instructions. Patient is to keep the biopsy site dry overnight, and then apply bacitracin twice daily until healed. Patient may apply hydrogen peroxide soaks to remove any crusting.
Dressing: bandage
Patient Will Remove Sutures At Home?: Yes
Billing Type: Third-Party Bill

## 2018-01-25 ENCOUNTER — OFFICE VISIT (OUTPATIENT)
Dept: MEDICAL GROUP | Facility: PHYSICIAN GROUP | Age: 78
End: 2018-01-25
Payer: MEDICARE

## 2018-01-25 VITALS
TEMPERATURE: 99 F | WEIGHT: 165 LBS | OXYGEN SATURATION: 98 % | HEIGHT: 63 IN | BODY MASS INDEX: 29.23 KG/M2 | DIASTOLIC BLOOD PRESSURE: 92 MMHG | SYSTOLIC BLOOD PRESSURE: 158 MMHG | RESPIRATION RATE: 16 BRPM | HEART RATE: 80 BPM

## 2018-01-25 DIAGNOSIS — R21 RASH: ICD-10-CM

## 2018-01-25 DIAGNOSIS — M51.36 DEGENERATIVE LUMBAR DISC: ICD-10-CM

## 2018-01-25 DIAGNOSIS — I10 ESSENTIAL HYPERTENSION, BENIGN: Chronic | ICD-10-CM

## 2018-01-25 DIAGNOSIS — M47.817 LUMBOSACRAL SPONDYLOSIS WITHOUT MYELOPATHY: ICD-10-CM

## 2018-01-25 PROCEDURE — 99214 OFFICE O/P EST MOD 30 MIN: CPT | Performed by: INTERNAL MEDICINE

## 2018-01-25 RX ORDER — HYDROCODONE BITARTRATE AND ACETAMINOPHEN 5; 325 MG/1; MG/1
TABLET ORAL
Qty: 45 TAB | Refills: 0 | Status: SHIPPED | OUTPATIENT
Start: 2018-02-24 | End: 2018-03-25

## 2018-01-25 RX ORDER — HYDROCODONE BITARTRATE AND ACETAMINOPHEN 5; 325 MG/1; MG/1
TABLET ORAL
Qty: 45 TAB | Refills: 0 | Status: SHIPPED | OUTPATIENT
Start: 2018-01-25 | End: 2018-01-25 | Stop reason: SDUPTHER

## 2018-01-25 ASSESSMENT — PAIN SCALES - GENERAL: PAINLEVEL: NO PAIN

## 2018-01-25 NOTE — ASSESSMENT & PLAN NOTE
Patient with chronic low back pain. She has been taking the meloxicam. Initially in the office visit she indicates she is not taking the hydrocodone however, later in the visit she says she has been taking one half tablet about one time a day when there is breakthrough pain. She isn't clear whether the meloxicam is helping the back pain or not. She has not had recent follow-up with Dr. España. She is requesting a handicap parking sticker.

## 2018-01-25 NOTE — PATIENT INSTRUCTIONS
Call us if no messages from Janice or us by next Thursday.      Ask to speak to Julianna if you come into the office next week.

## 2018-01-25 NOTE — ASSESSMENT & PLAN NOTE
Patient appears for a follow-up on a rash. This has been a problem since October of last year. It first began with several spots across her arms very itchy she came to be seen by the urgent care. She was treated with a course of steroids and that improved the symptoms however they were exacerbated and have spread. She has been seen by the urgent care a second time and a repeat course of steroids was given. She noted improvement however not as impressive as the first episode. She has self-referred to dermatology. She has had 2 skin biopsies of the area the most recent one today. She reports that the first biopsy revealed consistent with allergic reaction. She is pending the results of her second biopsy. She is pretty miserable she states she itches quite a bit area and she heard that this might be related to the Mobic so she self discontinued that for 4 several days, unfortunately, did not lose us anything improvement. She has been taking lisinopril for several years. She has not changed medicines. She is taking over-the-counter multivitamins and states she has dropped the vitamin E and is only taking the vitamin D and a multivitamin.

## 2018-01-25 NOTE — PROGRESS NOTES
Chief Complaint   Patient presents with   • Rash     rash all over legs and arms x3 months        HISTORY OF PRESENT ILLNESS: Patient is a 77 y.o. female established patient who presents today to discuss the medical issues below.    Rash  Patient appears for a follow-up on a rash. This has been a problem since October of last year. It first began with several spots across her arms very itchy she came to be seen by the urgent care. She was treated with a course of steroids and that improved the symptoms however they were exacerbated and have spread. She has been seen by the urgent care a second time and a repeat course of steroids was given. She noted improvement however not as impressive as the first episode. She has self-referred to dermatology. She has had 2 skin biopsies of the area the most recent one today. She reports that the first biopsy revealed consistent with allergic reaction. She is pending the results of her second biopsy. She is pretty miserable she states she itches quite a bit area and she heard that this might be related to the Mobic so she self discontinued that for 4 several days, unfortunately, did not lose us anything improvement. She has been taking lisinopril for several years. She has not changed medicines. She is taking over-the-counter multivitamins and states she has dropped the vitamin E and is only taking the vitamin D and a multivitamin.      Essential hypertension, benign  Patient is continuing on the lisinopril for years.      Degenerative lumbar disc  Patient with chronic low back pain. She has been taking the meloxicam. Initially in the office visit she indicates she is not taking the hydrocodone however, later in the visit she says she has been taking one half tablet about one time a day when there is breakthrough pain. She isn't clear whether the meloxicam is helping the back pain or not. She has not had recent follow-up with Dr. España. She is requesting a handicap parking  sticker.      Patient Active Problem List    Diagnosis Date Noted   • Trigger ring finger of right hand 04/10/2017     Priority: High   • Chronic fatigue 07/28/2016     Priority: High   • Lumbosacral spondylosis without myelopathy 04/04/2015     Priority: High   • Degenerative lumbar disc 02/23/2012     Priority: High   • Osteoarthritis of multiple joints      Priority: High   • Essential hypertension, benign 04/05/2013     Priority: Medium   • Pure hypercholesterolemia 03/25/2010     Priority: Medium   • Rash 01/25/2018   • Menopause 05/10/2017   • Cerumen debris on tympanic membrane of left ear 05/10/2017   • Breast pain, left 04/10/2017   • Excessive cerumen in both ear canals 10/20/2015   • Abnormal EKG 05/19/2015   • Pain of foot 03/20/2015   • Vitamin D deficiency 03/17/2011   • Symptomatic menopausal or female climacteric states    • Allergic rhinitis        Allergies:Gabapentin (phn); Pcn [penicillins]; and Tramadol    Current Outpatient Prescriptions   Medication Sig Dispense Refill   • [START ON 2/24/2018] hydrocodone-acetaminophen (NORCO) 5-325 MG Tab per tablet 1/2 tab tid prn pain 45 Tab 0   • lisinopril (PRINIVIL) 10 MG Tab Take 1 Tab by mouth every day. 90 Tab 3   • therapeutic multivitamin-minerals (THERAGRAN-M) Tab Take 1 Tab by mouth every day.     • vitamin D (CHOLECALCIFEROL) 1000 UNIT Tab Take 1,000 Units by mouth every day.     • thiamine (THIAMINE) 100 MG Tab Take 100 mg by mouth every day.     • predniSONE (DELTASONE) 10 MG Tab 2 tabs BID x 2 days, 3 tabs daily x 2 days, 2 tabs daily x 2 days, 1 tab daily x 2 days 20 Tab 0   • vitamin E (VITAMIN E) 1000 UNIT Cap Take 1 Cap by mouth every day.       No current facility-administered medications for this visit.          Past Medical History:   Diagnosis Date   • Abnormal EKG 5/19/2015   • Allergic rhinitis, cause unspecified    • Anesthesia     PONV   • Cerumen debris on tympanic membrane of left ear 5/10/2017   • Chronic fatigue 7/28/2016   •  "Dental disorder     partials, upper and lower   • Essential hypertension, benign 2013   • Essential hypertension, malignant    • Excessive cerumen in both ear canals 10/20/2015   • Hypokalemia 3/25/2010   • Menopause 5/10/2017   • Neuritis/radiculitis due to displacement of lumbar intervertebral disc 2012   • Osteoarthrosis involving, or with mention of more than one site, but not specified as generalized, multiple sites    • Pain 4/2/15    legs, mostly right, into foot   • Pain of foot 3/20/2015   • Pure hypercholesterolemia 3/25/2010   • Screening mammogram 2009    normal   • Symptomatic menopausal or female climacteric states    • Urinary bladder disorder     nocturia       Social History   Substance Use Topics   • Smoking status: Never Smoker   • Smokeless tobacco: Never Used   • Alcohol use No       Family Status   Relation Status   • Mother  at age 59    CVA,CAD   • Father  at age 82    cirrhosis of live     Family History   Problem Relation Age of Onset   • Heart Disease Mother      CAD   • Hypertension Mother    • Stroke Mother        ROS:    Respiratory: Negative for cough, sputum production, shortness of breath or wheezing.    Cardiovascular: Negative for chest pain, palpitations, orthopnea, dyspnea with exertion or edema.   Gastrointestinal: Negative for GI upset, nausea, vomiting, abdominal pain, constipation or diarrhea.   Genitourinary: Negative for dysuria, urgency, hesitancy or frequency.       Exam:    Blood pressure 158/92, pulse 80, temperature 37.2 °C (99 °F), resp. rate 16, height 1.6 m (5' 3\"), weight 74.8 kg (165 lb), SpO2 98 %.  General:  Well nourished, well developed female in NAD.  Spine: Minimal diffuse tenderness of the low lumbar spine  Skin. Annular target lesions presence bilateral arms upper legs more diffusely across her back.  Pulmonary: Clear to ausculation and percussion.  Normal effort. No rales, rhonchi, or wheezing.  Cardiovascular: Regular rate " and rhythm without murmur.   Abdomen: Normal bowel sounds soft and nontender no palpable liver spleen bladder mass.  Extremities: No LE edema noted.  Neuro: Grossly nonfocal.  Psych: Alert and oriented to person, place, and time. Appropriate mood and conversation.        This dictation was created using voice recognition software. I have made reasonable attempts to correct errors, however, errors of grammar and content may exist.          Assessment/Plan:    1. Rash  Most clinically consistent with the erythema multiforme. Consideration for allergic reaction to medications both the lisinopril and the meloxicam carry approximate 2% risk of this. Offered the patient the medication changes however she indicates that because she's just had the skin biopsies and was told by dermatology to hold off on medication changes for now she wants to wait until the biopsy results are back. She does have some topical creams from dermatology which is helping to medicate the itching. Attempts to explain options of changing medications, expecting long half-life for any hope resolution, potential need for prednisone however, the patient is pretty adamant she does not want to make any changes. She will therefore contact us next week if she has not heard from dermatology    2. Essential hypertension, benign  As noted above she's been on lisinopril for quite a while. 2% chance this is contributing to the rash. Patient is reticent and declines any medication changes pending biopsy. She suboptimally controlled in the office today but she is somewhat upset about the long delay in being able to get in to be seen. Not clear why she was having difficulty getting an appointment for 3 months.    3. Lumbosacral spondylosis without myelopathy  Patient is an [place] a poor historian. She has been utilizing the hydrocodone one half tablet at about once a day. Ultimately after a second long discussion she does agree to a trial discontinuation of the  meloxicam. Rely on hydrocodone at 2.5 mg 3 times a day for pain management and monitor clinical response as well as rash. Short-term parking form is filled out. Early follow-up may need referral back to spine  - CONSENT FOR OPIATE PRESCRIPTION  - hydrocodone-acetaminophen (NORCO) 5-325 MG Tab per tablet; 1/2 tab tid prn pain  Dispense: 45 Tab; Refill: 0    4. Degenerative lumbar disc  As above no recent follow-up with spine specialist.    5. Social  Discussions during the office visit with the patient frequently resulted in making recommendations that the patient ultimately declined. This has occurred in the past when we have discussed consideration for spinal injections as opposed to ongoing pain management. She has had a pain contract with us in the past and understands ramifications of chronic narcotic use. This is reviewed with her again. She initially declined any medication changes through this office stating she is waiting for the nurse practitioner with dermatology to determine this. At the time of discharge is when she ultimately agrees to trial discontinuation of the Mobic and replace with hydrocodone for short period of time. Patient appears to state understanding however overall ramifications of her reticence to make any changes is going to require ongoing monitoring.    Patient was seen for  25 minutes face to face of which more than 50% of the time was spent in counseling and coordination of care regarding the above problems.

## 2018-01-25 NOTE — LETTER
[facility]  Marbella LEYVA M.D.  [address]  [facility] [address]  Fax: [phone]   Authorization for Release/Disclosure of   Protected Health Information   Name: JULIENNE HINTON : 1940 SSN: [id number]   Address: [address] [facility] [address] Phone:    [phone] (home)    I authorize the entity listed below to release/disclose the PHI below to:   [facility]/Marbella LEYVA M.D. and Marbella LEYVA M.D.   Provider or Entity Name: Audrey Back      Address   City, State, [facility]   Phone:      Fax:     Reason for request: continuity of care   Information to be released:    [  ] LAST COLONOSCOPY,  including any PATH REPORT and follow-up  [  ] LAST FIT/COLOGUARD RESULT [  ] LAST DEXA  [  ] LAST MAMMOGRAM  [  ] LAST PAP  [  ] LAST LABS [  ] RETINA EXAM REPORT  [  ] IMMUNIZATION RECORDS  [ x ] Release all info  Most recent biopsy       [  ] Check here and initial the line next to each item to release ALL health information INCLUDING  _____ Care and treatment for drug and / or alcohol abuse  _____ HIV testing, infection status, or AIDS  _____ Genetic Testing    DATES OF SERVICE OR TIME PERIOD TO BE DISCLOSED: _____________  I understand and acknowledge that:  * This Authorization may be revoked at any time by you in writing, except if your health information has already been used or disclosed.  * Your health information that will be used or disclosed as a result of you signing this authorization could be re-disclosed by the recipient. If this occurs, your re-disclosed health information may no longer be protected by State or Federal laws.  * You may refuse to sign this Authorization. Your refusal will not affect your ability to obtain treatment.  * This Authorization becomes effective upon signing and will  on (date) __________.      If no date is indicated, this Authorization will  one (1) year from the signature date.    Name: Julienne Ross  Mirna    Signature:   Date:     1/25/2018       PLEASE FAX REQUESTED RECORDS BACK TO: [phone]

## 2018-01-31 ENCOUNTER — APPOINTMENT (RX ONLY)
Dept: URBAN - NONMETROPOLITAN AREA CLINIC 15 | Facility: CLINIC | Age: 78
Setting detail: DERMATOLOGY
End: 2018-01-31

## 2018-01-31 DIAGNOSIS — R21 RASH AND OTHER NONSPECIFIC SKIN ERUPTION: ICD-10-CM

## 2018-01-31 PROCEDURE — 99212 OFFICE O/P EST SF 10 MIN: CPT

## 2018-01-31 PROCEDURE — ? COUNSELING

## 2018-01-31 ASSESSMENT — LOCATION SIMPLE DESCRIPTION DERM
LOCATION SIMPLE: LEFT PRETIBIAL REGION
LOCATION SIMPLE: RIGHT THIGH
LOCATION SIMPLE: RIGHT HAND
LOCATION SIMPLE: RIGHT FOREARM
LOCATION SIMPLE: LEFT FOREARM
LOCATION SIMPLE: LEFT WRIST
LOCATION SIMPLE: LEFT THIGH
LOCATION SIMPLE: RIGHT PRETIBIAL REGION

## 2018-01-31 ASSESSMENT — LOCATION DETAILED DESCRIPTION DERM
LOCATION DETAILED: LEFT ANTERIOR DISTAL THIGH
LOCATION DETAILED: LEFT DORSAL WRIST
LOCATION DETAILED: LEFT PROXIMAL DORSAL FOREARM
LOCATION DETAILED: RIGHT RADIAL DORSAL HAND
LOCATION DETAILED: RIGHT PROXIMAL PRETIBIAL REGION
LOCATION DETAILED: RIGHT DISTAL RADIAL DORSAL FOREARM
LOCATION DETAILED: LEFT PROXIMAL PRETIBIAL REGION
LOCATION DETAILED: RIGHT ANTERIOR DISTAL THIGH

## 2018-01-31 ASSESSMENT — LOCATION ZONE DERM
LOCATION ZONE: HAND
LOCATION ZONE: LEG
LOCATION ZONE: ARM

## 2018-02-02 ENCOUNTER — TELEPHONE (OUTPATIENT)
Dept: MEDICAL GROUP | Facility: PHYSICIAN GROUP | Age: 78
End: 2018-02-02

## 2018-02-02 NOTE — TELEPHONE ENCOUNTER
Called skin cancer dermatology in [place] to get biopsy faxed over.  Left a message to fax to our office

## 2018-02-06 ENCOUNTER — APPOINTMENT (RX ONLY)
Dept: URBAN - NONMETROPOLITAN AREA CLINIC 15 | Facility: CLINIC | Age: 78
Setting detail: DERMATOLOGY
End: 2018-02-06

## 2018-02-06 DIAGNOSIS — R21 RASH AND OTHER NONSPECIFIC SKIN ERUPTION: ICD-10-CM

## 2018-02-06 DIAGNOSIS — Z48.02 ENCOUNTER FOR REMOVAL OF SUTURES: ICD-10-CM

## 2018-02-06 PROCEDURE — 99212 OFFICE O/P EST SF 10 MIN: CPT

## 2018-02-06 PROCEDURE — ? SUTURE REMOVAL (GLOBAL PERIOD)

## 2018-02-06 PROCEDURE — ? COUNSELING

## 2018-02-06 PROCEDURE — ? PATIENT SPECIFIC COUNSELING

## 2018-02-06 ASSESSMENT — LOCATION ZONE DERM: LOCATION ZONE: LEG

## 2018-02-06 ASSESSMENT — LOCATION SIMPLE DESCRIPTION DERM
LOCATION SIMPLE: RIGHT THIGH
LOCATION SIMPLE: LEFT THIGH

## 2018-02-06 ASSESSMENT — LOCATION DETAILED DESCRIPTION DERM
LOCATION DETAILED: RIGHT ANTERIOR DISTAL THIGH
LOCATION DETAILED: LEFT ANTERIOR PROXIMAL THIGH

## 2018-02-06 NOTE — PROCEDURE: SUTURE REMOVAL (GLOBAL PERIOD)
Add 95190 Cpt? (Important Note: In 2017 The Use Of 65205 Is Being Tracked By Cms To Determine Future Global Period Reimbursement For Global Periods): no
Detail Level: Detailed

## 2018-02-06 NOTE — PROCEDURE: COUNSELING
Detail Level: Detailed
Patient Specific Counseling (Will Not Stick From Patient To Patient): Rash has cleared nicely on abdomen and is fading on arms.  Little change is noted on legs at this time.

## 2018-02-06 NOTE — PROCEDURE: PATIENT SPECIFIC COUNSELING
Discussed that I did not want pt. to D/C several of her medications at once, because it will harder to figure out which medication caused the issue.  I will re-eval. pt in 3 weeks at that time we will discussed D/C other medications if there is no improvement.  Pt. also thinks that her hydrocodone cause rash to get worse when she took it.
Detail Level: Detailed

## 2018-02-07 NOTE — TELEPHONE ENCOUNTER
Called skin cancer and dermatology and LVM to call back regarding patients Biopsy.  We have not received the results yet.  LVM with medical records again to fax records over. Skin cancer phone number is  [phone]

## 2021-01-11 DIAGNOSIS — Z23 NEED FOR VACCINATION: ICD-10-CM

## 2021-01-23 NOTE — ASSESSMENT & PLAN NOTE
patient continues with the hydrocodone 1/2 bid.  Patient states 5/10 in am.  Worse with up and moving, no pain with sitting, 5-6/10 with starting to move, improves after about 1 hour, she is walking the dog daily.  Not following with Dr España.     no
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
## 2023-08-30 ENCOUNTER — OFFICE VISIT (OUTPATIENT)
Dept: URGENT CARE | Facility: PHYSICIAN GROUP | Age: 83
End: 2023-08-30
Payer: MEDICARE

## 2023-08-30 VITALS
HEART RATE: 59 BPM | RESPIRATION RATE: 16 BRPM | BODY MASS INDEX: 24.75 KG/M2 | WEIGHT: 145 LBS | SYSTOLIC BLOOD PRESSURE: 128 MMHG | TEMPERATURE: 97.8 F | HEIGHT: 64 IN | DIASTOLIC BLOOD PRESSURE: 72 MMHG | OXYGEN SATURATION: 97 %

## 2023-08-30 DIAGNOSIS — H61.23 BILATERAL IMPACTED CERUMEN: ICD-10-CM

## 2023-08-30 DIAGNOSIS — H60.311 ACUTE DIFFUSE OTITIS EXTERNA OF RIGHT EAR: ICD-10-CM

## 2023-08-30 PROCEDURE — 3074F SYST BP LT 130 MM HG: CPT | Performed by: NURSE PRACTITIONER

## 2023-08-30 PROCEDURE — 99203 OFFICE O/P NEW LOW 30 MIN: CPT | Mod: 25 | Performed by: NURSE PRACTITIONER

## 2023-08-30 PROCEDURE — 69210 REMOVE IMPACTED EAR WAX UNI: CPT | Performed by: NURSE PRACTITIONER

## 2023-08-30 PROCEDURE — 3078F DIAST BP <80 MM HG: CPT | Performed by: NURSE PRACTITIONER

## 2023-08-30 RX ORDER — AMLODIPINE BESYLATE 2.5 MG/1
2.5 TABLET ORAL
COMMUNITY
Start: 2023-07-15

## 2023-08-30 RX ORDER — ATENOLOL 25 MG/1
25 TABLET ORAL DAILY
COMMUNITY
Start: 2023-07-15

## 2023-08-30 RX ORDER — OFLOXACIN 3 MG/ML
5 SOLUTION AURICULAR (OTIC) DAILY
Qty: 10 ML | Refills: 0 | Status: SHIPPED | OUTPATIENT
Start: 2023-08-30 | End: 2023-10-12

## 2023-08-30 ASSESSMENT — ENCOUNTER SYMPTOMS
CHILLS: 0
NAUSEA: 0
COUGH: 0
FEVER: 0
DIZZINESS: 1
SORE THROAT: 0
HEADACHES: 0

## 2023-08-30 NOTE — PROGRESS NOTES
"Subjective:   Julienne Bradley is a 83 y.o. female who presents for Otalgia (R ear pain started yesterday, no fever, dizzy, )      Patient is a 83-year-old female presenting to clinic today with 1 day history of right ear pain, hearing loss, and mild dizziness.  Patient states that the pain did keep her up all night last night.  She denies any fever, chills, nasal congestion, sore throat, cough, or nausea.  She did take 2 baby aspirin this morning to help with the pain.  Denies any history of reoccurring ear infections.  Patient does wear bilateral hearing aids.    Review of Systems   Constitutional:  Negative for chills, fever and malaise/fatigue.   HENT:  Positive for ear pain and hearing loss. Negative for congestion, ear discharge and sore throat.    Respiratory:  Negative for cough.    Gastrointestinal:  Negative for nausea.   Skin:  Negative for rash.   Neurological:  Positive for dizziness. Negative for headaches.       Medications, Allergies, and current problem list reviewed today in Epic.     Objective:     /72   Pulse (!) 59   Temp 36.6 °C (97.8 °F) (Temporal)   Resp 16   Ht 1.626 m (5' 4\")   Wt 65.8 kg (145 lb)   SpO2 97%     Physical Exam  Vitals reviewed.   Constitutional:       Appearance: Normal appearance.   HENT:      Head: Normocephalic.      Right Ear: There is impacted cerumen.      Left Ear: There is impacted cerumen.      Nose: Nose normal. No congestion or rhinorrhea.      Mouth/Throat:      Mouth: Mucous membranes are moist.      Pharynx: No posterior oropharyngeal erythema.   Eyes:      Extraocular Movements: Extraocular movements intact.      Conjunctiva/sclera: Conjunctivae normal.      Pupils: Pupils are equal, round, and reactive to light.   Cardiovascular:      Rate and Rhythm: Normal rate.   Pulmonary:      Effort: Pulmonary effort is normal.      Breath sounds: Normal breath sounds. No wheezing or rhonchi.   Musculoskeletal:         General: Normal range of motion. "      Cervical back: Normal range of motion and neck supple.   Skin:     General: Skin is warm and dry.   Neurological:      Mental Status: She is alert and oriented to person, place, and time.   Psychiatric:         Mood and Affect: Mood normal.         Behavior: Behavior normal.         Thought Content: Thought content normal.         Judgment: Judgment normal.         Assessment/Plan:     Diagnosis and associated orders:     1. Bilateral impacted cerumen        2. Acute diffuse otitis externa of right ear  ofloxacin otic sol (FLOXIN OTIC) 0.3 % Solution         Comments/MDM:     Patient is a very pleasant 83-year-old female who presents clinic today with right ear pain.  On physical exam patient had bilateral impacted cerumen.  Procedure: Bilateral Cerumen Removal  Risks and benefits of procedure discussed  Cerumen removed with lavage and culturette after softening agent instilled by provider  Patient tolerated well  Post procedure exam with left  clear canal and normal TM, right ear canal is erythematous and tender, TM normal.  We will go ahead and treat for otitis externa with ofloxacin.  Appropriate use was discussed with patient.  May use Tylenol help with pain.  Recommended patient not wear hearing aids in right ear until symptoms improved.  Follow-up in clinic or with primary care provider if symptoms continue or worsen.           Differential diagnosis, natural history, supportive care, and indications for immediate follow-up discussed.    Advised the patient to follow-up with the primary care physician for recheck, reevaluation, and consideration of further management.    Please note that this dictation was created using voice recognition software. I have made a reasonable attempt to correct obvious errors, but I expect that there are errors of grammar and possibly content that I did not discover before finalizing the note.

## 2023-10-12 ENCOUNTER — OFFICE VISIT (OUTPATIENT)
Dept: URGENT CARE | Facility: PHYSICIAN GROUP | Age: 83
End: 2023-10-12
Payer: MEDICARE

## 2023-10-12 VITALS
HEIGHT: 64 IN | HEART RATE: 63 BPM | SYSTOLIC BLOOD PRESSURE: 112 MMHG | WEIGHT: 145 LBS | BODY MASS INDEX: 24.75 KG/M2 | TEMPERATURE: 98.6 F | DIASTOLIC BLOOD PRESSURE: 62 MMHG | OXYGEN SATURATION: 96 % | RESPIRATION RATE: 16 BRPM

## 2023-10-12 DIAGNOSIS — R53.83 OTHER FATIGUE: ICD-10-CM

## 2023-10-12 PROCEDURE — 99213 OFFICE O/P EST LOW 20 MIN: CPT

## 2023-10-12 PROCEDURE — 3078F DIAST BP <80 MM HG: CPT

## 2023-10-12 PROCEDURE — 3074F SYST BP LT 130 MM HG: CPT

## 2023-10-12 ASSESSMENT — ENCOUNTER SYMPTOMS
SORE THROAT: 0
ABDOMINAL PAIN: 0
COUGH: 0
VOMITING: 0
HEADACHES: 0
NAUSEA: 1
FEVER: 0
SHORTNESS OF BREATH: 0

## 2023-10-12 NOTE — PROGRESS NOTES
Subjective:     CHIEF COMPLAINT  Chief Complaint   Patient presents with    Fatigue     X3 days Fatigue, nausea, no energy,        HPI  Julienne Vandana Bradley is a very pleasant 83 y.o. female who presents with fatigue that has been present for several months but has worsened over the past 3 days.  She reports that she has been feeling intermittently nauseous without vomiting.  She has not had any fevers, congestion, sore throat, or cough.  She has not had any chest pain or shortness of breath.  She reports that she has had good sleep and typically sleeps from 9 PM to 7 AM.  Her daughter states that she has a poor diet and does not get enough exercise.  When asked if the patient to be depressed, she said possibly due to her aches and pains although she does not feel particularly sad.  She has a history of chronic fatigue.      REVIEW OF SYSTEMS  Review of Systems   Constitutional:  Positive for malaise/fatigue. Negative for fever.   HENT:  Negative for congestion and sore throat.    Respiratory:  Negative for cough and shortness of breath.    Cardiovascular:  Negative for chest pain.   Gastrointestinal:  Positive for nausea. Negative for abdominal pain and vomiting.   Genitourinary:  Negative for dysuria, frequency and urgency.   Neurological:  Negative for headaches.       PAST MEDICAL HISTORY  Patient Active Problem List    Diagnosis Date Noted    Rash 01/25/2018    Menopause 05/10/2017    Cerumen debris on tympanic membrane of left ear 05/10/2017    Breast pain, left 04/10/2017    Trigger ring finger of right hand 04/10/2017    Chronic fatigue 07/28/2016    Excessive cerumen in both ear canals 10/20/2015    Abnormal EKG 05/19/2015    Lumbosacral spondylosis without myelopathy 04/04/2015    Pain of foot 03/20/2015    Essential hypertension, benign 04/05/2013    Degenerative lumbar disc 02/23/2012    Vitamin D deficiency 03/17/2011    Pure hypercholesterolemia 03/25/2010    Symptomatic menopausal or female  "climacteric states     Osteoarthritis of multiple joints     Allergic rhinitis        SURGICAL HISTORY   has a past surgical history that includes laminotomy; carpal tunnel release (2006); and lumbar laminectomy diskectomy (4/4/2015).    ALLERGIES  Allergies   Allergen Reactions    Gabapentin (Phn) Unspecified     Patient stated that she believes medication made it hard for her to stand alone and use restroom.     Pcn [Penicillins] Rash    Tramadol      dizzy       CURRENT MEDICATIONS  Home Medications       Reviewed by JONELLE Valdivia-VENKAT (Physician Assistant) on 10/12/23 at 1544  Med List Status: <None>     Medication Last Dose Status   amLODIPine (NORVASC) 2.5 MG Tab Taking Active   atenolol (TENORMIN) 25 MG Tab Taking Active   lisinopril (PRINIVIL) 10 MG Tab Taking Active   therapeutic multivitamin-minerals (THERAGRAN-M) Tab Taking Active   thiamine (THIAMINE) 100 MG Tab Not Taking Active                    SOCIAL HISTORY  Social History     Tobacco Use    Smoking status: Never    Smokeless tobacco: Never   Substance and Sexual Activity    Alcohol use: No    Drug use: No    Sexual activity: Never     Partners: Male     Comment: ,retired       FAMILY HISTORY  Family History   Problem Relation Age of Onset    Heart Disease Mother         CAD    Hypertension Mother     Stroke Mother           Objective:     VITAL SIGNS: /62   Pulse 63   Temp 37 °C (98.6 °F) (Temporal)   Resp 16   Ht 1.626 m (5' 4\")   Wt 65.8 kg (145 lb)   SpO2 96%   BMI 24.89 kg/m²     PHYSICAL EXAM  Physical Exam  Vitals reviewed. Exam conducted with a chaperone present.   Constitutional:       General: She is not in acute distress.     Appearance: Normal appearance. She is not ill-appearing or toxic-appearing.   HENT:      Head: Normocephalic and atraumatic.      Right Ear: External ear normal.      Left Ear: External ear normal.      Nose: Nose normal. No congestion.      Mouth/Throat:      Mouth: Mucous membranes " are moist.      Pharynx: No oropharyngeal exudate or posterior oropharyngeal erythema.      Comments: Uvula midline  Eyes:      Conjunctiva/sclera: Conjunctivae normal.   Cardiovascular:      Rate and Rhythm: Normal rate and regular rhythm.      Heart sounds: Normal heart sounds.   Pulmonary:      Effort: Pulmonary effort is normal. No respiratory distress.      Breath sounds: Normal breath sounds. No stridor. No wheezing, rhonchi or rales.   Musculoskeletal:         General: Normal range of motion.      Cervical back: Normal range of motion.   Skin:     General: Skin is warm and dry.      Coloration: Skin is not pale.   Neurological:      General: No focal deficit present.      Mental Status: She is alert and oriented to person, place, and time.   Psychiatric:         Mood and Affect: Mood normal.         Assessment/Plan:     1. Other fatigue  - TSH WITH REFLEX TO FT4; Future  -Increase exercise as tolerated.  Stationary bicycles are a good joint friendly form of exercise  -Increase nutritious foods in diet  -Tylenol/ibuprofen OTC as needed for discomfort  -Return to clinic if symptoms fail to resolve  -Follow-up with PCP    MDM/Comments:  Patient has stable vital signs and is non-toxic appearing. Discussed supportive care with hydration, rest, Tylenol/Ibuprofen as needed.  Thyroid panel ordered to rule out possible thyroid pathology resulting in chronic fatigue.  Patient's physical exam was unremarkable with no evidence of active infection at this time.  Patient denies any urinary symptoms, cough, or fever.  Discussed lifestyle changes such as increasing exercise, increasing nutritious food and diet, and increasing socialization/outdoor activities.  Patient was offered COVID Cepheid testing and politely declined at this time.  Patient demonstrated understanding of treatment plan at this time and will RTC if symptoms worsen or fail to resolve.     Differential diagnosis, natural history, supportive care, and  indications for immediate follow-up discussed. All questions answered. Patient agrees with the plan of care.    Follow-up as needed if symptoms worsen or fail to improve to PCP, Urgent care or Emergency Room.    I have personally reviewed prior external notes and test results pertinent to today's visit.  I have independently reviewed and interpreted all diagnostics ordered during this urgent care acute visit.   Discussed management options (risks,benefits, and alternatives to treatment). Pt expresses understanding and the treatment plan was agreed upon. Questions were encouraged and answered to pt's satisfaction.    Please note that this dictation was created using voice recognition software. I have made a reasonable attempt to correct obvious errors, but I expect that there are errors of grammar and possibly content that I did not discover before finalizing the note.

## 2023-10-13 ENCOUNTER — HOSPITAL ENCOUNTER (OUTPATIENT)
Dept: LAB | Facility: MEDICAL CENTER | Age: 83
End: 2023-10-13
Payer: MEDICARE

## 2023-10-13 DIAGNOSIS — R53.83 OTHER FATIGUE: ICD-10-CM

## 2023-10-13 LAB — TSH SERPL DL<=0.005 MIU/L-ACNC: 1.44 UIU/ML (ref 0.38–5.33)

## 2023-10-13 PROCEDURE — 36415 COLL VENOUS BLD VENIPUNCTURE: CPT

## 2023-10-13 PROCEDURE — 84443 ASSAY THYROID STIM HORMONE: CPT

## 2024-02-27 ENCOUNTER — HOSPITAL ENCOUNTER (OUTPATIENT)
Dept: LAB | Facility: MEDICAL CENTER | Age: 84
End: 2024-02-27
Attending: FAMILY MEDICINE
Payer: MEDICARE

## 2024-02-27 LAB
25(OH)D3 SERPL-MCNC: 64 NG/ML (ref 30–100)
ALBUMIN SERPL BCP-MCNC: 4.2 G/DL (ref 3.2–4.9)
ALBUMIN/GLOB SERPL: 1.4 G/DL
ALP SERPL-CCNC: 77 U/L (ref 30–99)
ALT SERPL-CCNC: 19 U/L (ref 2–50)
ANION GAP SERPL CALC-SCNC: 11 MMOL/L (ref 7–16)
AST SERPL-CCNC: 25 U/L (ref 12–45)
BASOPHILS # BLD AUTO: 0.5 % (ref 0–1.8)
BASOPHILS # BLD: 0.04 K/UL (ref 0–0.12)
BILIRUB SERPL-MCNC: 0.5 MG/DL (ref 0.1–1.5)
BUN SERPL-MCNC: 19 MG/DL (ref 8–22)
CALCIUM ALBUM COR SERPL-MCNC: 9.2 MG/DL (ref 8.5–10.5)
CALCIUM SERPL-MCNC: 9.4 MG/DL (ref 8.5–10.5)
CHLORIDE SERPL-SCNC: 101 MMOL/L (ref 96–112)
CO2 SERPL-SCNC: 27 MMOL/L (ref 20–33)
CREAT SERPL-MCNC: 0.72 MG/DL (ref 0.5–1.4)
EOSINOPHIL # BLD AUTO: 0.08 K/UL (ref 0–0.51)
EOSINOPHIL NFR BLD: 1 % (ref 0–6.9)
ERYTHROCYTE [DISTWIDTH] IN BLOOD BY AUTOMATED COUNT: 46.5 FL (ref 35.9–50)
GFR SERPLBLD CREATININE-BSD FMLA CKD-EPI: 83 ML/MIN/1.73 M 2
GLOBULIN SER CALC-MCNC: 3 G/DL (ref 1.9–3.5)
GLUCOSE SERPL-MCNC: 108 MG/DL (ref 65–99)
HCT VFR BLD AUTO: 43.1 % (ref 37–47)
HGB BLD-MCNC: 14.3 G/DL (ref 12–16)
IMM GRANULOCYTES # BLD AUTO: 0.01 K/UL (ref 0–0.11)
IMM GRANULOCYTES NFR BLD AUTO: 0.1 % (ref 0–0.9)
LYMPHOCYTES # BLD AUTO: 2.2 K/UL (ref 1–4.8)
LYMPHOCYTES NFR BLD: 28.1 % (ref 22–41)
MCH RBC QN AUTO: 32.4 PG (ref 27–33)
MCHC RBC AUTO-ENTMCNC: 33.2 G/DL (ref 32.2–35.5)
MCV RBC AUTO: 97.7 FL (ref 81.4–97.8)
MONOCYTES # BLD AUTO: 0.59 K/UL (ref 0–0.85)
MONOCYTES NFR BLD AUTO: 7.5 % (ref 0–13.4)
NEUTROPHILS # BLD AUTO: 4.9 K/UL (ref 1.82–7.42)
NEUTROPHILS NFR BLD: 62.8 % (ref 44–72)
NRBC # BLD AUTO: 0 K/UL
NRBC BLD-RTO: 0 /100 WBC (ref 0–0.2)
PLATELET # BLD AUTO: 318 K/UL (ref 164–446)
PMV BLD AUTO: 11.2 FL (ref 9–12.9)
POTASSIUM SERPL-SCNC: 4.2 MMOL/L (ref 3.6–5.5)
PROT SERPL-MCNC: 7.2 G/DL (ref 6–8.2)
RBC # BLD AUTO: 4.41 M/UL (ref 4.2–5.4)
SODIUM SERPL-SCNC: 139 MMOL/L (ref 135–145)
TSH SERPL DL<=0.005 MIU/L-ACNC: 1.3 UIU/ML (ref 0.38–5.33)
VIT B12 SERPL-MCNC: 883 PG/ML (ref 211–911)
WBC # BLD AUTO: 7.8 K/UL (ref 4.8–10.8)

## 2024-02-27 PROCEDURE — 84443 ASSAY THYROID STIM HORMONE: CPT

## 2024-02-27 PROCEDURE — 36415 COLL VENOUS BLD VENIPUNCTURE: CPT

## 2024-02-27 PROCEDURE — 80053 COMPREHEN METABOLIC PANEL: CPT

## 2024-02-27 PROCEDURE — 82607 VITAMIN B-12: CPT

## 2024-02-27 PROCEDURE — 85025 COMPLETE CBC W/AUTO DIFF WBC: CPT

## 2024-02-27 PROCEDURE — 82306 VITAMIN D 25 HYDROXY: CPT

## 2024-05-28 ENCOUNTER — OFFICE VISIT (OUTPATIENT)
Dept: URGENT CARE | Facility: PHYSICIAN GROUP | Age: 84
End: 2024-05-28
Payer: MEDICARE

## 2024-05-28 VITALS
OXYGEN SATURATION: 96 % | TEMPERATURE: 97.4 F | DIASTOLIC BLOOD PRESSURE: 72 MMHG | SYSTOLIC BLOOD PRESSURE: 138 MMHG | RESPIRATION RATE: 16 BRPM | HEART RATE: 64 BPM

## 2024-05-28 DIAGNOSIS — H61.23 EXCESSIVE CERUMEN IN BOTH EAR CANALS: ICD-10-CM
